# Patient Record
Sex: FEMALE | Race: WHITE | Employment: FULL TIME | URBAN - METROPOLITAN AREA
[De-identification: names, ages, dates, MRNs, and addresses within clinical notes are randomized per-mention and may not be internally consistent; named-entity substitution may affect disease eponyms.]

---

## 2017-02-14 DIAGNOSIS — N92.6 IRREGULAR MENSTRUAL CYCLE: ICD-10-CM

## 2017-02-14 DIAGNOSIS — N94.6 DYSMENORRHEA: ICD-10-CM

## 2017-02-14 RX ORDER — NORETHINDRONE ACETATE AND ETHINYL ESTRADIOL 1; 20 MG/1; UG/1
TABLET ORAL
Qty: 1 DOSE PACK | Refills: 2 | Status: SHIPPED | OUTPATIENT
Start: 2017-02-14 | End: 2017-06-22 | Stop reason: SDUPTHER

## 2017-06-20 DIAGNOSIS — N92.6 IRREGULAR MENSTRUAL CYCLE: ICD-10-CM

## 2017-06-20 DIAGNOSIS — N94.6 DYSMENORRHEA: ICD-10-CM

## 2017-06-20 NOTE — LETTER
6/23/2017 12:13 PM 
 
Ms. Mckinley Dobbins [facility] [facility] [id number] Dear Ms. Shaikh Else missed you! Please call our office at [phone] and schedule your annual exam  appointment for your continued care. Look forward to seeing you soon.   
 
 
 
Sincerely, 
 
 
Jazmine Meier MD

## 2017-06-22 RX ORDER — NORETHINDRONE ACETATE AND ETHINYL ESTRADIOL 1; .02 MG/1; MG/1
TABLET ORAL
Qty: 1 DOSE PACK | Refills: 2 | Status: SHIPPED | OUTPATIENT
Start: 2017-06-22 | End: 2017-07-26 | Stop reason: SDUPTHER

## 2017-07-26 DIAGNOSIS — N92.6 IRREGULAR MENSTRUAL CYCLE: ICD-10-CM

## 2017-07-26 DIAGNOSIS — N94.6 DYSMENORRHEA: ICD-10-CM

## 2017-07-26 NOTE — LETTER
7/31/2017 10:26 AM 
 
Ms. Shauna Dolan [address] [facility] [address] Dear Ms. Jenni Preciado missed you! Please call our office at [phone] and schedule your annual physical exam appointment for your continued care.  
 
 
 
Sincerely, 
 
 
Maryann Joseph MD

## 2017-07-30 RX ORDER — NORETHINDRONE ACETATE AND ETHINYL ESTRADIOL 1; .02 MG/1; MG/1
TABLET ORAL
Qty: 3 DOSE PACK | Refills: 0 | Status: SHIPPED | OUTPATIENT
Start: 2017-07-30 | End: 2018-02-16 | Stop reason: SDUPTHER

## 2018-02-16 DIAGNOSIS — N92.6 IRREGULAR MENSTRUAL CYCLE: ICD-10-CM

## 2018-02-16 DIAGNOSIS — N94.6 DYSMENORRHEA: ICD-10-CM

## 2018-02-17 RX ORDER — NORETHINDRONE ACETATE AND ETHINYL ESTRADIOL 1; .02 MG/1; MG/1
TABLET ORAL
Qty: 3 DOSE PACK | Refills: 0 | Status: SHIPPED | OUTPATIENT
Start: 2018-02-17 | End: 2019-07-10

## 2019-07-09 ENCOUNTER — OFFICE VISIT (OUTPATIENT)
Dept: OBGYN CLINIC | Age: 39
End: 2019-07-09

## 2019-07-09 VITALS
HEIGHT: 60 IN | WEIGHT: 222.4 LBS | SYSTOLIC BLOOD PRESSURE: 162 MMHG | BODY MASS INDEX: 43.66 KG/M2 | DIASTOLIC BLOOD PRESSURE: 99 MMHG

## 2019-07-09 DIAGNOSIS — N76.4 VULVAR ABSCESS: Primary | ICD-10-CM

## 2019-07-09 DIAGNOSIS — I10 HYPERTENSION, UNSPECIFIED TYPE: ICD-10-CM

## 2019-07-09 DIAGNOSIS — E66.01 OBESITY, MORBID (HCC): ICD-10-CM

## 2019-07-09 NOTE — PROGRESS NOTES
Vulvar lesion evaluation    Sadie Anderson is a 41 y.o. female  Patient's last menstrual period was 06/11/2019 (approximate). who presents with a lump on her left vulva  She noticed it 6 days ago. The lesion has shown the following change: it started to drain. She went to an urgent care place on Sunday. \"I couldn't take it anymore! \"  They put her on Doxy. She reports the following associated symptoms: She states it still burns when urine hits it. She denies the following associated symptoms: itching, pain, redness, drainage, swelling, irritation. Aggravating factors: none. Alleviating factors: none. Patient states her last annual exam was 6/2017 and her pap was normal in [place]. I advised patient that I see an ASCUS +HPV pap in [facility] from 2014 and I asked if she had any follow up after that she said she does not remember ever being told she had an abnormal pap smear. Denies hx of DM    Past Medical History:   Diagnosis Date    Dysmenorrhea 8/19/2016    Edema of both legs 7/31/2014    Hypertension      Past Surgical History:   Procedure Laterality Date    HX HEENT      tonsillectomy     Social History     Occupational History    Not on file   Tobacco Use    Smoking status: Never Smoker    Smokeless tobacco: Never Used   Substance and Sexual Activity    Alcohol use: Yes    Drug use: No    Sexual activity: Yes     Partners: Male     Birth control/protection: Pill     Family History   Problem Relation Age of Onset    Hypertension Mother        Allergies   Allergen Reactions    Pcn [Penicillins] Hives     Prior to Admission medications    Medication Sig Start Date End Date Taking?  Authorizing Provider   norethindrone-ethinyl estradiol (MICROGESTIN 1/20) 1-20 mg-mcg tab Take 1 tablet by mouth  daily 2/17/18  Yes Jatin Walters MD   omeprazole (PRILOSEC) 40 mg capsule TAKE ONE CAPSULE BY MOUTH EVERY DAY 7/13/16   Provider, Historical        Review of Systems: History obtained from the patient  Constitutional: negative for weight loss, fever, night sweats  GI: negative for change in bowel habits, abdominal pain, black or bloody stools  : negative for frequency, dysuria, hematuria, vaginal discharge  MSK: negative for back pain, joint pain, muscle pain  Skin: negative for itching, rash, hives elsewhere  Neuro: negative for dizziness, headache, confusion, weakness  Psych: negative for anxiety, depression, change in mood  Heme/lymph: negative for bleeding, bruising, pallor    Objective:  Visit Vitals  BP (!) 162/99 (BP 1 Location: Left arm, BP Patient Position: Sitting)   Ht 5' (1.524 m)   Wt 222 lb 6.4 oz (100.9 kg)   LMP 06/11/2019 (Approximate)   BMI 43.43 kg/m²       Physical Exam:   PHYSICAL EXAMINATION    Constitutional  · Appearance: well-nourished, well developed, alert, in no acute distress    Gastrointestinal  · Abdominal Examination: abdomen non-tender to palpation, normal bowel sounds, no masses present  · Liver and spleen: no hepatomegaly present, spleen not palpable  · Hernias: no hernias identified    Genitourinary  · External Genitalia: normal appearance for age, no discharge present, no tenderness present, no inflammatory lesions present, no masses present, no atrophy present  · Vagina: normal vaginal vault without central or paravaginal defects, no discharge present, no inflammatory lesions present, no masses present  · Bladder: non-tender to palpation  · Urethra: appears normal  · Cervix: normal   · Uterus: normal size, shape and consistency  · Adnexa: no adnexal tenderness present, no adnexal masses present  · Perineum: perineum within normal limits, no evidence of trauma, no rashes or skin lesions present  · Anus: anus within normal limits, no hemorrhoids present  · Inguinal Lymph Nodes: no lymphadenopathy present    Skin  · General Inspection: no rash, no lesions identified    Neurologic/Psychiatric  · Mental Status:  · Orientation: grossly oriented to person, place and time  · Mood and Affect: mood normal, affect appropriate    Assessment:   Vulvar abscess resolved  Hx of abnl pap  HTN  Hx of anovulation    Plan:   Stop OC  AE 8/2019  Menstrual calendar - consider monthly provera - not SA  See PCP to evaluate elevated BP ( on meds)    RTO prn if symptoms persist or worsen. Instructions given to pt. Handouts given to pt.

## 2019-07-09 NOTE — PATIENT INSTRUCTIONS
Female Reproductive Organs, Side View: Anatomy Sketch    Current as of: May 14, 2018  Content Version: 11.9  © 7317-2998 [employer]. Care instructions adapted under license by [employer] (which disclaims liability or warranty for this information). If you have questions about a medical condition or this instruction, always ask your healthcare professional. [place] [address] any warranty or liability for your use of this information.

## 2019-07-10 ENCOUNTER — OFFICE VISIT (OUTPATIENT)
Dept: FAMILY MEDICINE CLINIC | Age: 39
End: 2019-07-10

## 2019-07-10 VITALS
TEMPERATURE: 97.6 F | RESPIRATION RATE: 18 BRPM | SYSTOLIC BLOOD PRESSURE: 160 MMHG | WEIGHT: 222 LBS | HEIGHT: 60 IN | DIASTOLIC BLOOD PRESSURE: 90 MMHG | BODY MASS INDEX: 43.59 KG/M2 | HEART RATE: 100 BPM

## 2019-07-10 DIAGNOSIS — I10 BENIGN ESSENTIAL HTN: Primary | ICD-10-CM

## 2019-07-10 DIAGNOSIS — R00.0 TACHYCARDIA: ICD-10-CM

## 2019-07-10 DIAGNOSIS — F41.9 ANXIETY: ICD-10-CM

## 2019-07-10 RX ORDER — SERTRALINE HYDROCHLORIDE 25 MG/1
25 TABLET, FILM COATED ORAL DAILY
Qty: 30 TAB | Refills: 0 | Status: SHIPPED | OUTPATIENT
Start: 2019-07-10 | End: 2019-08-06 | Stop reason: SDUPTHER

## 2019-07-10 RX ORDER — METOPROLOL SUCCINATE 100 MG/1
100 TABLET, EXTENDED RELEASE ORAL DAILY
Qty: 30 TAB | Refills: 1 | Status: SHIPPED | OUTPATIENT
Start: 2019-07-10 | End: 2019-09-06 | Stop reason: SDUPTHER

## 2019-07-10 RX ORDER — METOPROLOL SUCCINATE 50 MG/1
TABLET, EXTENDED RELEASE ORAL
Refills: 2 | COMMUNITY
Start: 2019-06-14 | End: 2019-07-10 | Stop reason: DRUGHIGH

## 2019-07-10 NOTE — PROGRESS NOTES
HISTORY OF PRESENT ILLNESS  Mirtha Albrecht is a 41 y.o. female. Mirtha Albrecht is here to follow up on their HTN. She has been in [place] for the past 2 years and had a workup for tachycardia in February. I have reviewed her records and she had normal thyroid testing, a normal echocardiogram and a 24 hour Holter showing persistent tachy cardia. Her HTN is a chronic problem. The current episode started more than 1 week ago. The problem occurs constantly and is getting worse. Pertinent negatives include no chest pain, no abdominal pain, no headaches and no shortness of breath. Nothing aggravates the symptoms. The symptoms are relieved by Toprol XL, which is working mildly. Also, she has been easily excitable for the past six months. Her symptoms are unchanged. This started with her diagnosis of tachycardia. She has tried relaxation techniques which help sometimes. She worries a lot about her health. Her biggest triggers are feeling her heart beating in her chest and certain customers at work. She has never had problems with this before and she has no psych history. No family history of psych issues, but her father is easily excitable as well. She has chronic tingling in the fingertips of both hands. Review of Systems   Constitutional: Negative for weight loss. No weight gain   Eyes: Negative for blurred vision. Respiratory: Negative for shortness of breath. Cardiovascular: Positive for palpitations. Negative for chest pain and leg swelling. Gastrointestinal: Negative for abdominal pain. Neurological: Negative for dizziness, sensory change, speech change, focal weakness and headaches. Psychiatric/Behavioral: Negative for depression, hallucinations, substance abuse and suicidal ideas. The patient is nervous/anxious and has insomnia. She has trouble falling asleep about 2-3 times a week.        Visit Vitals  BP (!) 171/105   Pulse (!) 112   Temp 97.6 °F (36.4 °C) (Oral) Resp 18   Ht 5' (1.524 m)   Wt 222 lb (100.7 kg)   LMP 06/11/2019 (Approximate)   BMI 43.36 kg/m²     BP Readings from Last 3 Encounters:   07/10/19 (!) 171/105   07/09/19 (!) 162/99   08/19/16 135/87     Physical Exam   Constitutional: She is oriented to person, place, and time. She appears well-developed and well-nourished. No distress. Cardiovascular: Normal rate, regular rhythm and normal heart sounds. Exam reveals no gallop and no friction rub. No murmur heard. Pulmonary/Chest: Effort normal and breath sounds normal. No respiratory distress. She has no wheezes. She has no rales. Musculoskeletal: She exhibits no edema. Neurological: She is alert and oriented to person, place, and time. She displays no tremor. Skin: Skin is warm and dry. She is not diaphoretic. Psychiatric: She has a normal mood and affect. Her behavior is normal. Judgment and thought content normal.   Initially anxious and tearful about her blood pressure   Nursing note and vitals reviewed. ASSESSMENT and PLAN    ICD-10-CM ICD-9-CM    1. Benign essential HTN I10 401.1 metoprolol succinate (TOPROL XL) 100 mg tablet   2. Anxiety F41.9 300.00 sertraline (ZOLOFT) 25 mg tablet      REFERRAL TO PSYCHOLOGY   3. Tachycardia R00.0 785.0 REFERRAL TO CARDIOLOGY        Blood pressure needs improvement  Anxiety, needs treatment  Tachycardia, worsened by anxiety  Increase Toprol XL  Start Zoloft  Psychology referral  Cardiology referral            Reviewed plan of care. Patient has provided input and agrees with goals.

## 2019-07-10 NOTE — PROGRESS NOTES
Chief Complaint   Patient presents with    Blood Pressure Check     f/u and bp med eval     1. Have you been to the ER, urgent care clinic since your last visit? Hospitalized since your last visit? No     2. Have you seen or consulted any other health care providers outside of the [address] [place] [address] since your last visit? Include any pap smears or colon screening.  No

## 2019-08-06 DIAGNOSIS — F41.9 ANXIETY: ICD-10-CM

## 2019-08-07 RX ORDER — SERTRALINE HYDROCHLORIDE 25 MG/1
TABLET, FILM COATED ORAL
Qty: 30 TAB | Refills: 0 | Status: SHIPPED | OUTPATIENT
Start: 2019-08-07 | End: 2019-09-08 | Stop reason: SDUPTHER

## 2019-09-06 DIAGNOSIS — I10 BENIGN ESSENTIAL HTN: ICD-10-CM

## 2019-09-06 RX ORDER — METOPROLOL SUCCINATE 100 MG/1
TABLET, EXTENDED RELEASE ORAL
Qty: 30 TAB | Refills: 0 | Status: SHIPPED | OUTPATIENT
Start: 2019-09-06 | End: 2019-10-02 | Stop reason: SDUPTHER

## 2019-09-06 NOTE — TELEPHONE ENCOUNTER
On call note. Pt running out of Metoprolol 100 mg. She is going out of town for 2 weeks. Will refill and instructed pt to set appt with Dr Alonzo Sierra.

## 2019-09-08 DIAGNOSIS — F41.9 ANXIETY: ICD-10-CM

## 2019-09-09 RX ORDER — SERTRALINE HYDROCHLORIDE 25 MG/1
TABLET, FILM COATED ORAL
Qty: 30 TAB | Refills: 0 | Status: SHIPPED | OUTPATIENT
Start: 2019-09-09 | End: 2019-10-18

## 2019-10-02 DIAGNOSIS — I10 BENIGN ESSENTIAL HTN: ICD-10-CM

## 2019-10-02 RX ORDER — METOPROLOL SUCCINATE 100 MG/1
TABLET, EXTENDED RELEASE ORAL
Qty: 30 TAB | Refills: 0 | Status: SHIPPED | OUTPATIENT
Start: 2019-10-02 | End: 2019-10-18

## 2019-10-18 ENCOUNTER — OFFICE VISIT (OUTPATIENT)
Dept: FAMILY MEDICINE CLINIC | Age: 39
End: 2019-10-18

## 2019-10-18 VITALS
HEIGHT: 60 IN | SYSTOLIC BLOOD PRESSURE: 147 MMHG | BODY MASS INDEX: 44.37 KG/M2 | HEART RATE: 100 BPM | RESPIRATION RATE: 18 BRPM | WEIGHT: 226 LBS | TEMPERATURE: 97.7 F | DIASTOLIC BLOOD PRESSURE: 100 MMHG

## 2019-10-18 DIAGNOSIS — R60.0 BILATERAL LEG EDEMA: ICD-10-CM

## 2019-10-18 DIAGNOSIS — F41.9 ANXIETY: Primary | ICD-10-CM

## 2019-10-18 DIAGNOSIS — I10 BENIGN ESSENTIAL HTN: ICD-10-CM

## 2019-10-18 RX ORDER — METOPROLOL SUCCINATE 200 MG/1
TABLET, EXTENDED RELEASE ORAL
Qty: 30 TAB | Refills: 1 | Status: SHIPPED | OUTPATIENT
Start: 2019-10-18 | End: 2019-12-15 | Stop reason: SDUPTHER

## 2019-10-18 RX ORDER — SERTRALINE HYDROCHLORIDE 25 MG/1
TABLET, FILM COATED ORAL
Qty: 30 TAB | Refills: 0 | Status: SHIPPED | OUTPATIENT
Start: 2019-10-18 | End: 2020-05-18

## 2019-10-18 NOTE — PROGRESS NOTES
Chief Complaint   Patient presents with    Blood Pressure Check     1. Have you been to the ER, urgent care clinic since your last visit? Hospitalized since your last visit? No     2. Have you seen or consulted any other health care providers outside of the [address] [place] [address] since your last visit? Include any pap smears or colon screening. No     Pt declines tdap.

## 2019-10-18 NOTE — PROGRESS NOTES
HISTORY OF PRESENT ILLNESS  Jose Elias Gordon is a 42 y.o. female. Patient presents with:  Blood Pressure Check    Jose Elias Gordon is here to follow up on her anxiety. It is unchanged. At her last visit, I started her on Zoloft, but she only took it a little over 2 weeks and because she was having hot spells. She thinks it was working and would like to retry it. Driving sometimes makes her anxiety worse. She is also here to follow up on her HTN. Her blood pressure is elevated again. Review of Systems   Constitutional: Negative for malaise/fatigue and weight loss. No weight gain   Eyes: Negative for blurred vision. Respiratory: Negative for shortness of breath. Cardiovascular: Positive for leg swelling. Negative for chest pain and palpitations. She had an episode of LE edema when she went to a conference during the and sat with her legs hanging. Gastrointestinal: Negative for abdominal pain. Neurological: Negative for dizziness, sensory change, speech change, focal weakness and headaches. Psychiatric/Behavioral: Negative for depression, hallucinations, substance abuse and suicidal ideas. The patient is nervous/anxious. The patient does not have insomnia. Visit Vitals  BP (!) 147/100   Pulse 100   Temp 97.7 °F (36.5 °C) (Oral)   Resp 18   Ht 5' (1.524 m)   Wt 226 lb (102.5 kg)   BMI 44.14 kg/m²     BP Readings from Last 3 Encounters:   07/10/19 160/90   07/09/19 (!) 162/99   08/19/16 135/87     Physical Exam   Constitutional: She is oriented to person, place, and time. She appears well-developed and well-nourished. No distress. Cardiovascular: Normal rate, regular rhythm and normal heart sounds. Exam reveals no gallop and no friction rub. No murmur heard. Pulmonary/Chest: Effort normal and breath sounds normal. No respiratory distress. She has no wheezes. She has no rales. Musculoskeletal: She exhibits no edema.    Neurological: She is alert and oriented to person, place, and time. She displays no tremor. Skin: Skin is warm and dry. She is not diaphoretic. Psychiatric: She has a normal mood and affect. Her behavior is normal. Judgment and thought content normal.   Nursing note and vitals reviewed. ASSESSMENT and PLAN    ICD-10-CM ICD-9-CM    1. Anxiety F41.9 300.00 sertraline (ZOLOFT) 25 mg tablet   2. Benign essential HTN I07 149.9 METABOLIC PANEL, BASIC      metoprolol succinate (TOPROL-XL) 200 mg XL tablet   3. Bilateral leg edema R60.0 782. 3         Blood pressure elevated  Still with anxiety, off Zoloft  Transient edema  Increase Toprol XL  Restart Zoloft  Labs per orders. Follow-up and Dispositions    · Return in about 1 month (around 11/18/2019) for blood pressure, anxiety. Reviewed plan of care. Patient has provided input and agrees with goals.

## 2019-12-15 DIAGNOSIS — I10 BENIGN ESSENTIAL HTN: ICD-10-CM

## 2019-12-16 RX ORDER — METOPROLOL SUCCINATE 200 MG/1
TABLET, EXTENDED RELEASE ORAL
Qty: 30 TAB | Refills: 1 | Status: SHIPPED | OUTPATIENT
Start: 2019-12-16 | End: 2019-12-17 | Stop reason: SDUPTHER

## 2019-12-17 DIAGNOSIS — I10 BENIGN ESSENTIAL HTN: ICD-10-CM

## 2019-12-17 NOTE — TELEPHONE ENCOUNTER
Pt scheduled for 3 month HTN follow up on 1/17/20 and is requesting refill for Metoprolol be sent to local pharmacy.  Tanisha

## 2019-12-21 RX ORDER — METOPROLOL SUCCINATE 200 MG/1
200 TABLET, EXTENDED RELEASE ORAL DAILY
Qty: 30 TAB | Refills: 1 | Status: SHIPPED | OUTPATIENT
Start: 2019-12-21 | End: 2020-04-24

## 2020-04-22 DIAGNOSIS — I10 BENIGN ESSENTIAL HTN: ICD-10-CM

## 2020-04-24 RX ORDER — METOPROLOL SUCCINATE 200 MG/1
TABLET, EXTENDED RELEASE ORAL
Qty: 30 TAB | Refills: 1 | Status: SHIPPED | OUTPATIENT
Start: 2020-04-24 | End: 2020-06-21

## 2020-05-18 ENCOUNTER — VIRTUAL VISIT (OUTPATIENT)
Dept: FAMILY MEDICINE CLINIC | Age: 40
End: 2020-05-18

## 2020-05-18 VITALS — WEIGHT: 226 LBS | BODY MASS INDEX: 44.37 KG/M2 | HEIGHT: 60 IN | RESPIRATION RATE: 16 BRPM

## 2020-05-18 DIAGNOSIS — I10 BENIGN ESSENTIAL HTN: Primary | ICD-10-CM

## 2020-05-18 DIAGNOSIS — F41.9 ANXIETY: ICD-10-CM

## 2020-05-18 NOTE — PROGRESS NOTES
Vik Tamayo is a 42 y.o. female who was seen by synchronous (real-time) audio-video technology on 5/18/2020. Assessment & Plan:   Diagnoses and all orders for this visit:    1. Benign essential HTN  -     METABOLIC PANEL, BASIC; Future    2. Anxiety        Unknown BP  Anxiety controlled  Labs per orders. Continue current plans for now  She will call me with her blood pressure and pulse later this week    Follow-up and Dispositions    · Return pending BP result. Reviewed plan of care. Patient has provided input and agrees with goals. CPT Codes 08029-57424 for Established Patients may apply to this Telehealth Visit      Subjective:   Vik Tamayo was seen for Medication Evaluation      Vik Tamayo is here to follow up on their HTN. This is a chronic problem. The problem occurs constantly and is ? Chelle Shetty The symptoms are relieved by Toprol XL, which is/are working ? Chelle Shetty She will be getting a blood pressure machine this week. She also needs follow up on her anxiety, however, she has not been taking the Zoloft. It is more work related and she can easily get ramped, but it does not interfere with her job. Review of Systems   Constitutional: Positive for weight loss. No weight gain   Eyes: Negative for blurred vision. Respiratory: Negative for shortness of breath. Cardiovascular: Positive for leg swelling. Negative for chest pain. Gastrointestinal: Negative for abdominal pain. Neurological: Negative for dizziness, sensory change, speech change, focal weakness and headaches. Objective:     Visit Vitals  Resp 16   Ht 5' (1.524 m)   Wt 226 lb (102.5 kg)   LMP  (LMP Unknown) Comment: Not having regular periods   BMI 44.14 kg/m²     BP Readings from Last 3 Encounters:   10/18/19 (!) 147/100   07/10/19 160/90   07/09/19 (!) 162/99       Physical Exam  Constitutional:       General: She is not in acute distress. Appearance: Normal appearance.  She is not diaphoretic. Neurological:      Mental Status: She is alert and oriented to person, place, and time. Motor: No tremor. Psychiatric:         Mood and Affect: Mood normal.         Behavior: Behavior normal.         Thought Content: Thought content normal.         Judgment: Judgment normal.         Due to this being a TeleHealth evaluation, many elements of the physical examination are unable to be assessed. We discussed the expected course, resolution and complications of the diagnosis(es) in detail. Medication risks, benefits, costs, interactions, and alternatives were discussed as indicated. I advised her to contact the office if her condition worsens, changes or fails to improve as anticipated. She expressed understanding with the diagnosis(es) and plan. Pursuant to the emergency declaration under the [facility], [facility] waiver authority and the [employer] and [employer] Act, this Virtual  Visit was conducted, with patient's consent, to reduce the patient's risk of exposure to COVID-19 and provide continuity of care for an established patient. Services were provided through a video synchronous discussion virtually to substitute for in-person clinic visit.     Henrique Salazar MD

## 2020-05-18 NOTE — PROGRESS NOTES
Chief Complaint   Patient presents with    Medication Evaluation     1. Have you been to the ER, urgent care clinic since your last visit? Hospitalized since your last visit? No    2. Have you seen or consulted any other health care providers outside of the [address] [place] [address] since your last visit? Include any pap smears or colon screening.  No    Patient completing visit from [place], [place].

## 2020-05-19 ENCOUNTER — HOSPITAL ENCOUNTER (OUTPATIENT)
Dept: LAB | Age: 40
Discharge: HOME | End: 2020-05-19

## 2020-05-19 DIAGNOSIS — I10 BENIGN ESSENTIAL HTN: ICD-10-CM

## 2020-05-19 LAB
ANION GAP SERPL CALC-SCNC: 6 MMOL/L (ref 5–15)
BUN SERPL-MCNC: 7 MG/DL (ref 6–20)
BUN/CREAT SERPL: 11 (ref 12–20)
CALCIUM SERPL-MCNC: 9.6 MG/DL (ref 8.5–10.1)
CHLORIDE SERPL-SCNC: 100 MMOL/L (ref 97–108)
CO2 SERPL-SCNC: 31 MMOL/L (ref 21–32)
CREAT SERPL-MCNC: 0.64 MG/DL (ref 0.55–1.02)
GLUCOSE SERPL-MCNC: 121 MG/DL (ref 65–100)
POTASSIUM SERPL-SCNC: 4.4 MMOL/L (ref 3.5–5.1)
SODIUM SERPL-SCNC: 137 MMOL/L (ref 136–145)

## 2020-05-20 DIAGNOSIS — R73.09 ELEVATED GLUCOSE: Primary | ICD-10-CM

## 2020-05-21 ENCOUNTER — TELEPHONE (OUTPATIENT)
Dept: FAMILY MEDICINE CLINIC | Age: 40
End: 2020-05-21

## 2020-05-21 NOTE — TELEPHONE ENCOUNTER
----- Message from Pinger sent at 5/21/2020 11:09 AM EDT -----  Regarding: Dr. Bright Owens  Pt is requesting a call back to discuss her lab slip not being enclosed on her [employer] message. Best contact number is [phone].

## 2020-05-25 DIAGNOSIS — I10 BENIGN ESSENTIAL HTN: Primary | ICD-10-CM

## 2020-05-25 RX ORDER — AMLODIPINE BESYLATE 5 MG/1
5 TABLET ORAL DAILY
Qty: 30 TAB | Refills: 1 | Status: SHIPPED | OUTPATIENT
Start: 2020-05-25 | End: 2020-06-23

## 2020-06-02 ENCOUNTER — HOSPITAL ENCOUNTER (OUTPATIENT)
Dept: LAB | Age: 40
Discharge: HOME | End: 2020-06-02

## 2020-06-02 DIAGNOSIS — R73.09 ELEVATED GLUCOSE: ICD-10-CM

## 2020-06-02 LAB
EST. AVERAGE GLUCOSE BLD GHB EST-MCNC: 111 MG/DL
HBA1C MFR BLD: 5.5 % (ref 4–5.6)

## 2020-06-21 DIAGNOSIS — I10 BENIGN ESSENTIAL HTN: ICD-10-CM

## 2020-06-21 RX ORDER — METOPROLOL SUCCINATE 200 MG/1
TABLET, EXTENDED RELEASE ORAL
Qty: 30 TAB | Refills: 0 | Status: SHIPPED | OUTPATIENT
Start: 2020-06-21 | End: 2020-07-21

## 2020-06-22 NOTE — TELEPHONE ENCOUNTER
Please call patient and find out what her recent blood pressure and pulse are. I cannot continue to refill her medication until I have this information.

## 2020-06-23 ENCOUNTER — TELEPHONE (OUTPATIENT)
Dept: FAMILY MEDICINE CLINIC | Age: 40
End: 2020-06-23

## 2020-06-23 DIAGNOSIS — I10 BENIGN ESSENTIAL HTN: ICD-10-CM

## 2020-06-23 RX ORDER — AMLODIPINE BESYLATE 10 MG/1
10 TABLET ORAL DAILY
Qty: 30 TAB | Refills: 1 | Status: SHIPPED | OUTPATIENT
Start: 2020-06-23 | End: 2020-08-04 | Stop reason: ALTCHOICE

## 2020-06-23 NOTE — TELEPHONE ENCOUNTER
Pt called to report BP from this am per Dr Vida Oreilly request:  127/90    She is also having fairly severe pain in the front thigh muscle. States it started over the weekend when she pulled her back. States there is a knot in the muscle. Has tried stretches which are not helping. Please advise.  Pt can be reached at [phone]

## 2020-06-23 NOTE — TELEPHONE ENCOUNTER
Please call patient and let her know I am increasing her amlodipine to 10 mg and have sent this to her pharmacy. I need to see her for her blood pressure in 6 weeks. Needs to go to urgent care or the ER today to have the \"knot\" in her leg evaluated.

## 2020-07-18 DIAGNOSIS — I10 BENIGN ESSENTIAL HTN: ICD-10-CM

## 2020-07-18 NOTE — LETTER
7/22/2020 4:30 PM 
 
Ms. Mariah Logan [address] [facility] [address] [facility] 99 [address] Dear Ms. AndinoconnerAugustus Ngan missed you! Please call our office at [phone] and schedule a blood pressure  follow up appointment for your continued care, with in the next 30 days. We are offering virtual appointments as well. Look forward to seeing you soon.   
 
 
 
Sincerely, 
 
 
Fior Medrano MD

## 2020-07-21 RX ORDER — METOPROLOL SUCCINATE 200 MG/1
TABLET, EXTENDED RELEASE ORAL
Qty: 30 TAB | Refills: 0 | Status: SHIPPED | OUTPATIENT
Start: 2020-07-21 | End: 2020-08-20

## 2020-08-03 ENCOUNTER — E-VISIT (OUTPATIENT)
Dept: FAMILY MEDICINE CLINIC | Age: 40
End: 2020-08-03

## 2020-08-03 DIAGNOSIS — I10 BENIGN ESSENTIAL HTN: Primary | ICD-10-CM

## 2020-08-04 ENCOUNTER — VIRTUAL VISIT (OUTPATIENT)
Dept: FAMILY MEDICINE CLINIC | Age: 40
End: 2020-08-04
Payer: COMMERCIAL

## 2020-08-04 DIAGNOSIS — I10 BENIGN ESSENTIAL HTN: Primary | ICD-10-CM

## 2020-08-04 DIAGNOSIS — R60.0 BILATERAL LEG EDEMA: ICD-10-CM

## 2020-08-04 PROCEDURE — 99213 OFFICE O/P EST LOW 20 MIN: CPT | Performed by: FAMILY MEDICINE

## 2020-08-04 RX ORDER — HYDROCHLOROTHIAZIDE 25 MG/1
25 TABLET ORAL DAILY
Qty: 30 TAB | Refills: 1 | Status: SHIPPED | OUTPATIENT
Start: 2020-08-04 | End: 2020-09-24 | Stop reason: SDUPTHER

## 2020-08-04 NOTE — PROGRESS NOTES
Chief Complaint   Patient presents with    Leg Swelling    Ankle swelling   1. Have you been to the ER, urgent care clinic since your last visit? Hospitalized since your last visit? No    2. Have you seen or consulted any other health care providers outside of the [address] [place] [address] since your last visit? Include any pap smears or colon screening.  No

## 2020-08-04 NOTE — PROGRESS NOTES
Jose Elias Gordon is a 42 y.o. female who was seen by synchronous (real-time) audio-video technology on 8/4/2020. Assessment & Plan:   Diagnoses and all orders for this visit:    1. Benign essential HTN  -     hydroCHLOROthiazide (HYDRODIURIL) 25 mg tablet; Take 1 Tab by mouth daily. 2. Bilateral leg edema      Blood pressure controlled  Edema due to Norvasc  Stop Norvasc  Start hydrochlorothiazide      Follow-up and Dispositions    · Return in about 6 weeks (around 9/15/2020) for blood pressure. Reviewed plan of care. Patient has provided input and agrees with goals. CPT Codes 81895-06644 for Established Patients may apply to this Telehealth Visit      Subjective:   Jose Elias Gordon was seen for Leg Swelling (Bilateral) and Ankle swelling      Jose Elias Gordon is here to follow up on their HTN. This is a chronic problem. The problem occurs constantly and is improved. The symptoms are relieved by Norvasc, Toprol XL, which is/are working well, however, since her Norvasc was increased, she has had swelling in her legs and ankles. Review of Systems   Constitutional: Negative for weight loss. No weight gain   Eyes: Negative for blurred vision. Respiratory: Negative for shortness of breath. Cardiovascular: Positive for leg swelling. Negative for chest pain. Gastrointestinal: Negative for abdominal pain. Neurological: Negative for dizziness, sensory change, speech change, focal weakness and headaches. Objective:   /87, P 96, T 97.7 F  Physical Exam  Constitutional:       General: She is not in acute distress. Appearance: Normal appearance. Musculoskeletal:      Right lower leg: Edema present. Left lower leg: Edema present. Neurological:      Mental Status: She is alert and oriented to person, place, and time. Due to this being a TeleHealth evaluation, many elements of the physical examination are unable to be assessed.          We discussed the expected course, resolution and complications of the diagnosis(es) in detail. Medication risks, benefits, costs, interactions, and alternatives were discussed as indicated. I advised her to contact the office if her condition worsens, changes or fails to improve as anticipated. She expressed understanding with the diagnosis(es) and plan. Pursuant to the emergency declaration under the [address] [place] [address] waiver authority and the [employer] and [employer] Act, this Virtual  Visit was conducted, with patient's consent, to reduce the patient's risk of exposure to COVID-19 and provide continuity of care for an established patient. Services were provided through a video synchronous discussion virtually to substitute for in-person clinic visit.     Sunday Bach MD

## 2020-08-16 DIAGNOSIS — I10 BENIGN ESSENTIAL HTN: ICD-10-CM

## 2020-08-16 NOTE — LETTER
8/18/2020 2:00 PM 
 
Ms. Jenny Pritchett [address] [facility] [address] [facility] 99 [address] Dear MsGabrielle Franky Erica missed you! Please call our office at [phone] and schedule a blood pressure  follow up appointment for your continued care. Look forward to seeing you soon, we are offering virtual appointments as well.   
 
 
 
Sincerely, 
 
 
Sunday Bach MD

## 2020-08-18 RX ORDER — AMLODIPINE BESYLATE 10 MG/1
TABLET ORAL
Qty: 30 TAB | Refills: 1 | Status: SHIPPED | OUTPATIENT
Start: 2020-08-18 | End: 2020-09-24 | Stop reason: SDUPTHER

## 2020-08-19 DIAGNOSIS — I10 BENIGN ESSENTIAL HTN: ICD-10-CM

## 2020-08-20 RX ORDER — METOPROLOL SUCCINATE 200 MG/1
TABLET, EXTENDED RELEASE ORAL
Qty: 30 TAB | Refills: 0 | Status: SHIPPED | OUTPATIENT
Start: 2020-08-20 | End: 2020-09-24 | Stop reason: SDUPTHER

## 2020-09-24 DIAGNOSIS — I10 BENIGN ESSENTIAL HTN: ICD-10-CM

## 2020-09-24 RX ORDER — HYDROCHLOROTHIAZIDE 25 MG/1
25 TABLET ORAL DAILY
Qty: 30 TAB | Refills: 0 | Status: SHIPPED | OUTPATIENT
Start: 2020-09-24 | End: 2020-11-05 | Stop reason: SDUPTHER

## 2020-09-24 RX ORDER — METOPROLOL SUCCINATE 200 MG/1
TABLET, EXTENDED RELEASE ORAL
Qty: 30 TAB | Refills: 0 | OUTPATIENT
Start: 2020-09-24

## 2020-09-24 RX ORDER — AMLODIPINE BESYLATE 10 MG/1
TABLET ORAL
Qty: 30 TAB | Refills: 1 | OUTPATIENT
Start: 2020-09-24

## 2020-09-24 RX ORDER — HYDROCHLOROTHIAZIDE 25 MG/1
25 TABLET ORAL DAILY
Qty: 30 TAB | Refills: 0 | OUTPATIENT
Start: 2020-09-24

## 2020-09-24 RX ORDER — METOPROLOL SUCCINATE 200 MG/1
TABLET, EXTENDED RELEASE ORAL
Qty: 30 TAB | Refills: 0 | Status: SHIPPED | OUTPATIENT
Start: 2020-09-24 | End: 2020-10-23 | Stop reason: SDUPTHER

## 2020-09-24 RX ORDER — AMLODIPINE BESYLATE 10 MG/1
TABLET ORAL
Qty: 30 TAB | Refills: 0 | Status: SHIPPED | OUTPATIENT
Start: 2020-09-24 | End: 2020-10-07

## 2020-09-24 NOTE — TELEPHONE ENCOUNTER
Due to work pt is unable to be seen until next Tuesday, and has scheduled for that date. Pt will run out of blood pressure medication today. Pt does ask for a refill and will keep scheduled appointment.

## 2020-09-24 NOTE — TELEPHONE ENCOUNTER
Pt already scheduled for 6 week blood pressure follow up with Dr. Roman Miller on 9/29/20, but is now completely out of blood pressure medication and wants to  at pharmacy today. Dr. Roman Miller initially refused prescriptions due to pt needing appointment but agrees to fill since she saw pt last month and follow up is scheduled.   Tanisha

## 2020-10-07 ENCOUNTER — TELEPHONE (OUTPATIENT)
Dept: FAMILY MEDICINE CLINIC | Age: 40
End: 2020-10-07

## 2020-10-07 ENCOUNTER — VIRTUAL VISIT (OUTPATIENT)
Dept: FAMILY MEDICINE CLINIC | Age: 40
End: 2020-10-07
Payer: COMMERCIAL

## 2020-10-07 DIAGNOSIS — F41.9 ANXIETY: ICD-10-CM

## 2020-10-07 DIAGNOSIS — E66.01 OBESITY, MORBID (HCC): ICD-10-CM

## 2020-10-07 DIAGNOSIS — I10 BENIGN ESSENTIAL HTN: Primary | ICD-10-CM

## 2020-10-07 DIAGNOSIS — N94.6 DYSMENORRHEA: ICD-10-CM

## 2020-10-07 DIAGNOSIS — R00.0 TACHYCARDIA: ICD-10-CM

## 2020-10-07 DIAGNOSIS — R60.0 EDEMA OF BOTH LEGS: ICD-10-CM

## 2020-10-07 DIAGNOSIS — F40.298 SPECIFIC PHOBIA: ICD-10-CM

## 2020-10-07 PROCEDURE — 99214 OFFICE O/P EST MOD 30 MIN: CPT | Performed by: FAMILY MEDICINE

## 2020-10-07 RX ORDER — LISINOPRIL 10 MG/1
10 TABLET ORAL DAILY
Qty: 30 TAB | Refills: 1 | Status: SHIPPED | OUTPATIENT
Start: 2020-10-07 | End: 2020-11-05 | Stop reason: SDUPTHER

## 2020-10-07 NOTE — PROGRESS NOTES
Chief Complaint   Patient presents with    Hypertension     Follow up.  Establish Care     Wants to transfer from Dr Gustavo Parr. 1. Have you been to the ER, urgent care clinic since your last visit? Hospitalized since your last visit? No    2. Have you seen or consulted any other health care providers outside of the [address] [place] [address] since your last visit? Include any pap smears or colon screening.  No

## 2020-10-07 NOTE — PROGRESS NOTES
Carlos Manuel Gallardo is a 47 y.o. female who was seen by synchronous (real-time) audio-video technology on 10/7/2020. Consent: Carlos Manuel Gallardo, who was seen by synchronous (real-time) audio-video technology, and/or her healthcare decision maker, is aware that this patient-initiated, Telehealth encounter on 10/7/2020 is a billable service, with coverage as determined by her insurance carrier. She is aware that she may receive a bill and has provided verbal consent to proceed: Yes. Assessment & Plan:   1. Benign essential HTN  Not quite to goal  Add lisinopril 10, goal of combo pill with hctz to limit pill burden  C/w metoprolol and hctz  Recheck 1 m  May need f/u labs at that time for lytes  - lisinopriL (PRINIVIL, ZESTRIL) 10 mg tablet; Take 1 Tab by mouth daily. Dispense: 30 Tab; Refill: 1    2. Tachycardia  Stable, reviewed workup from [place] ([address]) seems benign, stable on Metoprolol, no change today    3. Edema of both legs  Was a s/e of amlodipine, off med and on hctz doing much better    4. Dysmenorrhea  Was amenorrheic for about 1 year, recently in light of weight gain started cycles again, has had 2 , first had a lot of heavy bleeding and dysmenorrhea, 2nd was OK and \"normal\" will watch  Due for pap, please schedule with Dr Sherri Dyson! 5. Obesity, morbid ([address]  GREAT WORK! Keep up the excellent work on weight loss through healthy eating, continued exercise    6. Anxiety  7. Specific phobia  Triggering event: highway driving, recommend therapy specifically geared towards confronting and extinguishing if possible. Pt understanding, would like to stay off meds for now, resource list sent through [employer]          Pt was counseled on risks, benefits and alternatives of treatment options. All questions were asked and answered and the patient was agreeable with the treatment plan as outlined.     Encounter time today was 28 minutes and more than 50% of this encounter was spent in counseling face-to-face regarding Diagnosis, Patient Education, Medication Management, Compliance and Impressions. Time documented includes face to face time, documentation and chart review if applicable except as noted. Subjective:   Rachel Vega is a 47 y.o. female who was seen for Hypertension (Follow up.) and Establish Care (Wants to transfer from  [facility].)    Coming off of amlodipine which was working but had side effect of LE swelling and HCTZ is helping with water retention but her bp is running a little less controlled than we would have hoped for    Weight; she is at 203, she's working hard on getting her weight off, last office visit she was 226. She's doing fasting, eating window is 11-6. She is eating low carb and doing really well with this. Enjoys horseback riding, did [employer] jumping, now it is a hobby    Anxiety: not on zoloft right now, doesn't want to take medications. She thinks she's having panic attacks when she's driving. She can't figure out why. She travels a lot with work, so driving is normal and it seems to be coming out of nowhere, it seems to stunt her social life (she feels like the interstate triggers it and she gets a feeling of overwhelmed)  She gets anxious too if someone else is driving    Dysmenorrhea: seeing Dr [facility], was having irregular periods, thought it was due to weight gain, had a period in August and it was heavy and uncomfortable, last month was \"normal\" for her and was 2-3 days and she is tracking her cycles. Most recent interval was 30 days  Right now using condoms for protection from pregnancy      Medications, allergies, PMH, PSH, SOCH, [employer] [employer] [facility] reviewed and updated per routine protocol, see chart for review and changes if not noted here. ROS  A 12 point review of systems was negative except as noted here or in the HPI.     Objective:   Vital Signs: (As obtained by patient/caregiver at home)  Patient-Reported Vitals 10/7/2020   Patient-Reported Weight 203lb Patient-Reported Height -   Patient-Reported Pulse 108   Patient-Reported Temperature -   Patient-Reported Systolic  938   Patient-Reported Diastolic 81   Patient-Reported LMP 09/18/2020        [INSTRUCTIONS:  \"[x]\" Indicates a positive item  \"[]\" Indicates a negative item  -- DELETE ALL ITEMS NOT EXAMINED]    Constitutional: [x] Appears well-developed and well-nourished [x] No apparent distress      [] Abnormal -     Mental status: [x] Alert and awake  [x] Oriented to person/place/time [x] Able to follow commands    [] Abnormal -     Eyes:   EOM    [x]  Normal    [] Abnormal -   Sclera  [x]  Normal    [] Abnormal -          Discharge [x]  None visible   [] Abnormal -     HENT: [x] Normocephalic, atraumatic  [] Abnormal -   [x] Mouth/Throat: Mucous membranes are moist    External Ears [x] Normal  [] Abnormal -    Neck: [x] No visualized mass [] Abnormal -     Pulmonary/Chest: [x] Respiratory effort normal   [x] No visualized signs of difficulty breathing or respiratory distress        [] Abnormal -      Musculoskeletal:   [] Normal gait with no signs of ataxia         [x] Normal range of motion of neck        [] Abnormal -     Neurological:        [x] No Facial Asymmetry (Cranial nerve 7 motor function) (limited exam due to video visit)          [x] No gaze palsy        [] Abnormal -          Skin:        [x] No significant exanthematous lesions or discoloration noted on facial skin         [] Abnormal -            Psychiatric:       [x] Normal Affect [] Abnormal -        [x] No Hallucinations    Other pertinent observable physical exam findings:well appearing, seated at desk, pleasant, normal mood    We discussed the expected course, resolution and complications of the diagnosis(es) in detail. Medication risks, benefits, costs, interactions, and alternatives were discussed as indicated. I advised her to contact the office if her condition worsens, changes or fails to improve as anticipated.  She expressed understanding with the diagnosis(es) and plan. Carlos Manuel Gallardo is a 47 y.o. female who was evaluated by a video visit encounter for concerns as above. Patient identification was verified prior to start of the visit. A caregiver was present when appropriate. Due to this being a TeleHealth encounter (During K-36 public health emergency), evaluation of the following organ systems was limited: Vitals/Constitutional/EENT/Resp/CV/GI//MS/Neuro/Skin/Heme-Lymph-Imm. Pursuant to the emergency declaration under the [address] [place] [address], [facility] waiver authority and the [employer] Act, this Virtual  Visit was conducted, with patient's (and/or legal guardian's) consent, to reduce the patient's risk of exposure to COVID-19 and provide necessary medical care. Services were provided through a video synchronous discussion virtually to substitute for in-person clinic visit. Patient and provider were located at their individual homes. Ursula Hansen MD  [facility]  10/07/20 8:17 AM     Portions of this note may have been populated using smart dictation software and may have \"sounds-like\" errors present.

## 2020-10-07 NOTE — TELEPHONE ENCOUNTER
----- Message from Soraya Adams MD sent at 10/7/2020  8:42 AM EDT -----  Regarding: schedule patient  Please put patient on for 9am on 11/5/20 for bp check  VV  She has a cuff

## 2020-10-21 DIAGNOSIS — I10 BENIGN ESSENTIAL HTN: ICD-10-CM

## 2020-10-22 RX ORDER — METOPROLOL SUCCINATE 200 MG/1
TABLET, EXTENDED RELEASE ORAL
Qty: 30 TAB | Refills: 0 | OUTPATIENT
Start: 2020-10-22

## 2020-10-23 DIAGNOSIS — I10 BENIGN ESSENTIAL HTN: ICD-10-CM

## 2020-10-23 RX ORDER — METOPROLOL SUCCINATE 200 MG/1
TABLET, EXTENDED RELEASE ORAL
Qty: 30 TAB | Refills: 3 | Status: SHIPPED | OUTPATIENT
Start: 2020-10-23 | End: 2021-02-15

## 2020-10-23 NOTE — TELEPHONE ENCOUNTER
----- Message from Eric Wood sent at 10/23/2020 10:09 AM EDT -----  Regarding: YUDELKA/TELEPHONE  Contact: [phone]  General Message/Vendor Calls    Caller's first and last name: Catrina Douglas      Reason for call:  Request for Metoprolol 200 mg      Callback required yes/no and why: Yes      Best contact number(s):[phone]      Details to clarify the request: Per patient she was denied a refill for Metoprolol 200 mg. Patient would like to know if she could get the medication call in to the pharmacy until her scheduled appt 11/05/20. Per patient she has 2 tables left.       Eric Wood

## 2020-10-26 RX ORDER — METOPROLOL SUCCINATE 200 MG/1
TABLET, EXTENDED RELEASE ORAL
Qty: 30 TAB | Refills: 3 | OUTPATIENT
Start: 2020-10-26

## 2020-11-05 ENCOUNTER — VIRTUAL VISIT (OUTPATIENT)
Dept: FAMILY MEDICINE CLINIC | Age: 40
End: 2020-11-05
Payer: COMMERCIAL

## 2020-11-05 ENCOUNTER — TELEPHONE (OUTPATIENT)
Dept: FAMILY MEDICINE CLINIC | Age: 40
End: 2020-11-05

## 2020-11-05 DIAGNOSIS — I10 BENIGN ESSENTIAL HTN: Primary | ICD-10-CM

## 2020-11-05 DIAGNOSIS — R60.0 EDEMA OF BOTH LEGS: ICD-10-CM

## 2020-11-05 PROCEDURE — 99213 OFFICE O/P EST LOW 20 MIN: CPT | Performed by: FAMILY MEDICINE

## 2020-11-05 RX ORDER — LISINOPRIL AND HYDROCHLOROTHIAZIDE 10; 12.5 MG/1; MG/1
1 TABLET ORAL DAILY
Qty: 90 TAB | Refills: 1 | Status: SHIPPED | OUTPATIENT
Start: 2020-11-05 | End: 2021-02-15 | Stop reason: SDUPTHER

## 2020-11-05 NOTE — PROGRESS NOTES
Dennys Prater is a 47 y.o. female who was seen by synchronous (real-time) audio-video technology on 11/5/2020. Consent: Dennys Prater, who was seen by synchronous (real-time) audio-video technology, and/or her healthcare decision maker, is aware that this patient-initiated, Telehealth encounter on 11/5/2020 is a billable service, with coverage as determined by her insurance carrier. She is aware that she may receive a bill and has provided verbal consent to proceed: Yes. Assessment & Plan:   1. Benign essential HTN  Doing well  Change to lisinopril-hctz combo  Recheck 3-4 m in person    2. Edema of both legs  Improved, doing well          Pt was counseled on risks, benefits and alternatives of treatment options. All questions were asked and answered and the patient was agreeable with the treatment plan as outlined. Encounter time today was 17 minutes and more than 50% of this encounter was spent in counseling face-to-face regarding Diagnosis, Patient Education, Medication Management, Compliance and Impressions. Time documented includes face to face time, documentation and chart review if applicable except as noted. Subjective:   Dennys Prater is a 47 y.o. female who was seen for Hypertension (check )      HTN: patient reports stable on medications. No chest pain, sob, headache, blurred vision, leg swelling, diaphoresis, falls. Compliant with medications as prescribed. is checking blood pressures at home and reports range is 100-120/60-70s. No significant hyper or hypotensive episodes that the patient reports today. FEELS GOOD! Medications, allergies, PMH, PSH, SOCH, [employer] [facility] reviewed and updated per routine protocol, see chart for review and changes if not noted here. ROS  A 12 point review of systems was negative except as noted here or in the HPI.     Objective:   Vital Signs: (As obtained by patient/caregiver at home)  Patient-Reported Vitals 11/5/2020   Patient-Reported Weight 196lb Patient-Reported Height -   Patient-Reported Pulse 94   Patient-Reported Temperature 98.2   Patient-Reported Systolic  -   Patient-Reported Diastolic -   Patient-Reported LMP -        [INSTRUCTIONS:  \"[x]\" Indicates a positive item  \"[]\" Indicates a negative item  -- DELETE ALL ITEMS NOT EXAMINED]    Constitutional: [x] Appears well-developed and well-nourished [x] No apparent distress      [] Abnormal -     Mental status: [x] Alert and awake  [x] Oriented to person/place/time [x] Able to follow commands    [] Abnormal -     Eyes:   EOM    [x]  Normal    [] Abnormal -   Sclera  [x]  Normal    [] Abnormal -          Discharge [x]  None visible   [] Abnormal -     HENT: [x] Normocephalic, atraumatic  [] Abnormal -   [x] Mouth/Throat: Mucous membranes are moist    External Ears [x] Normal  [] Abnormal -    Neck: [x] No visualized mass [] Abnormal -     Pulmonary/Chest: [x] Respiratory effort normal   [x] No visualized signs of difficulty breathing or respiratory distress        [] Abnormal -      Musculoskeletal:   [] Normal gait with no signs of ataxia         [x] Normal range of motion of neck        [] Abnormal -     Neurological:        [x] No Facial Asymmetry (Cranial nerve 7 motor function) (limited exam due to video visit)          [x] No gaze palsy        [] Abnormal -          Skin:        [x] No significant exanthematous lesions or discoloration noted on facial skin         [] Abnormal -            Psychiatric:       [x] Normal Affect [] Abnormal -        [x] No Hallucinations    Other pertinent observable physical exam findings:seated at desk, well appearing    We discussed the expected course, resolution and complications of the diagnosis(es) in detail. Medication risks, benefits, costs, interactions, and alternatives were discussed as indicated. I advised her to contact the office if her condition worsens, changes or fails to improve as anticipated.  She expressed understanding with the diagnosis(es) and plan. Anahi Lu is a 47 y.o. female who was evaluated by a video visit encounter for concerns as above. Patient identification was verified prior to start of the visit. A caregiver was present when appropriate. Due to this being a TeleHealth encounter (During OMJZS-14 public health emergency), evaluation of the following organ systems was limited: Vitals/Constitutional/EENT/Resp/CV/GI//MS/Neuro/Skin/Heme-Lymph-Imm. Pursuant to the emergency declaration under the [address] [place] [address], Replaced by [facility] waiver authority and the [employer] Act, this Virtual  Visit was conducted, with patient's (and/or legal guardian's) consent, to reduce the patient's risk of exposure to COVID-19 and provide necessary medical care. Services were provided through a video synchronous discussion virtually to substitute for in-person clinic visit. Patient and provider were located at their individual homes. Angel Sherman MD  [facility]  11/05/20 9:07 AM     Portions of this note may have been populated using smart dictation software and may have \"sounds-like\" errors present.

## 2020-11-05 NOTE — TELEPHONE ENCOUNTER
----- Message from Dora Narvaez MD sent at 11/5/2020  9:16 AM EST -----  Regarding: dena hummel Pls put patient on for 3/5/21 at 830 am for a physical and bp recheck  IN OFFICE!

## 2020-11-05 NOTE — PROGRESS NOTES
Radha Lemon is a 47 y.o. female    Chief Complaint   Patient presents with    Hypertension     check    BP reading today 108/74    Health Maintenance Due   Topic Date Due    DTaP/Tdap/Td series (1 - Tdap) 09/23/2003    PAP AKA CERVICAL CYTOLOGY  12/01/2017    Flu Vaccine (1) 09/01/2020       There were no vitals taken for this visit. 3 most recent PHQ Screens 10/18/2019   Little interest or pleasure in doing things Not at all   Feeling down, depressed, irritable, or hopeless Not at all   Total Score PHQ 2 0       No flowsheet data found. No flowsheet data found. 1. Have you been to the ER, urgent care clinic since your last visit? Hospitalized since your last visit?no    2. Have you seen or consulted any other health care providers outside of the [address] [place] [address] since your last visit? Include any pap smears or colon screening.  no

## 2020-12-09 NOTE — PROGRESS NOTES
Jeronimo Valle is a [de-identified] ,  47 y.o. female [facility] whose Patient's last menstrual period was 11/25/2020. was on 11/25/2020 who presents for her annual checkup. She would like to discuss birth control options. She has only tried OCP in the past. Interested in IUD. With regard to the Gardisil vaccine, she is older than the FDA approved age to receive it. Menstrual status:    Her periods are light, moderate in flow. She is using three to five pads or tampons per day, usually regular and last 26-30 days. She denies dysmenorrhea. She reports no premenstrual symptoms. Contraception:    The current method of family planning is none. Previously on OCP but stopped d/t elevated BP. Sexual history:    She  reports being sexually active and has had partner(s) who are Male. She reports using the following method of birth control/protection: Pill. Medical conditions:    Since her last annual GYN exam about three or more years ago, she has not the following changes in her health history: none. Pap and Mammogram History:    Her most recent Pap smear was 12/1/2014 rare ASCUSl/HPV + 18+ - no colpo done    The patient has never had a mammogram.    The patient does not have a family history of breast cancer. Past Medical History:   Diagnosis Date    Benign essential HTN 7/10/2019    Dysmenorrhea 8/19/2016    Edema of both legs 7/31/2014    Hypertension      Past Surgical History:   Procedure Laterality Date    HX HEENT      tonsillectomy       Current Outpatient Medications   Medication Sig Dispense Refill    lisinopril-hydroCHLOROthiazide (PRINZIDE, ZESTORETIC) 10-12.5 mg per tablet Take 1 Tab by mouth daily.  90 Tab 1    metoprolol succinate (TOPROL-XL) 200 mg XL tablet TAKE 1 TABLET BY MOUTH EVERY DAY 30 Tab 3     Allergies: Norvasc [amlodipine] and Pcn [penicillins]   Social History     Socioeconomic History    Marital status: SINGLE     Spouse name: Not on file    Number of children: Not on file    Years of education: Not on file    Highest education level: Not on file   Occupational History    Not on file   Social Needs    Financial resource strain: Not on file    Food insecurity     Worry: Not on file     Inability: Not on file    Transportation needs     Medical: Not on file     Non-medical: Not on file   Tobacco Use    Smoking status: Never Smoker    Smokeless tobacco: Never Used   Substance and Sexual Activity    Alcohol use: Yes    Drug use: No    Sexual activity: Yes     Partners: Male     Birth control/protection: Pill   Lifestyle    Physical activity     Days per week: Not on file     Minutes per session: Not on file    Stress: Not on file   Relationships    Social connections     Talks on phone: Not on file     Gets together: Not on file     Attends Anabaptism service: Not on file     Active member of club or organization: Not on file     Attends meetings of clubs or organizations: Not on file     Relationship status: Not on file    Intimate partner violence     Fear of current or ex partner: Not on file     Emotionally abused: Not on file     Physically abused: Not on file     Forced sexual activity: Not on file   Other Topics Concern    Not on file   Social History Narrative    Not on file     Tobacco History:  reports that she has never smoked. She has never used smokeless tobacco.  Alcohol Abuse:  reports current alcohol use. Drug Abuse:  reports no history of drug use.     Patient Active Problem List   Diagnosis Code    Edema of both legs R60.0    Dysmenorrhea N94.6    Obesity, morbid ([address] E66.01    Benign essential HTN I10    Specific phobia F40.298       Review of Systems - History obtained from the patient  Constitutional: negative for weight loss, fever, night sweats  HEENT: negative for hearing loss, earache, congestion, snoring, sorethroat  CV: negative for chest pain, palpitations, edema  Resp: negative for cough, shortness of breath, wheezing  GI: negative for change in bowel habits, abdominal pain, black or bloody stools  : negative for frequency, dysuria, hematuria, vaginal discharge  MSK: negative for back pain, joint pain, muscle pain  Breast: negative for breast lumps, nipple discharge, galactorrhea  Skin :negative for itching, rash, hives  Neuro: negative for dizziness, headache, confusion, weakness  Psych: negative for anxiety, depression, change in mood  Heme/lymph: negative for bleeding, bruising, pallor    Physical Exam    Visit Vitals  /72   Ht 5' (1.524 m)   Wt 196 lb 3.2 oz (89 kg)   LMP 11/25/2020   BMI 38.32 kg/m²       Constitutional  · Appearance: well-nourished, well developed, alert, in no acute distress    HENT  · Head and Face: appears normal    Neck  · Inspection/Palpation: normal appearance, no masses or tenderness  · Lymph Nodes: no lymphadenopathy present  · Thyroid: gland size normal, nontender, no nodules or masses present on palpation    Chest  · Respiratory Effort: breathing normal  · Auscultation: normal breath sounds    Cardiovascular  · Heart:  · Auscultation: regular rate and rhythm without murmur    Breasts  · Inspection of Breasts: breasts symmetrical, no skin changes, no discharge present, nipple appearance normal, no skin retraction present  · Palpation of Breasts and Axillae: no masses present on palpation, no breast tenderness  · Axillary Lymph Nodes: no lymphadenopathy present    Gastrointestinal  · Abdominal Examination: abdomen non-tender to palpation, normal bowel sounds, no masses present  · Liver and spleen: no hepatomegaly present, spleen not palpable  · Hernias: no hernias identified    Genitourinary  · External Genitalia: normal appearance for age, no discharge present, no tenderness present, no inflammatory lesions present, no masses present, no atrophy present  · Vagina: normal vaginal vault without central or paravaginal defects, no discharge present, no inflammatory lesions present, no masses present  · Bladder: non-tender to palpation  · Urethra: appears normal  · Cervix: normal   · Uterus: normal size, shape and consistency  · Adnexa: no adnexal tenderness present, no adnexal masses present  · Perineum: perineum within normal limits, no evidence of trauma, no rashes or skin lesions present  · Anus: anus within normal limits, no hemorrhoids present  · Inguinal Lymph Nodes: no lymphadenopathy present    Skin  · General Inspection: no rash, no lesions identified    Neurologic/Psychiatric  · Mental Status:  · Orientation: grossly oriented to person, place and time  · Mood and Affect: mood normal, affect appropriate    . Assessment:  Routine gynecologic examination  Her current medical status is satisfactory with no evidence of significant gynecologic issues.   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

## 2020-12-10 ENCOUNTER — OFFICE VISIT (OUTPATIENT)
Dept: OBGYN CLINIC | Age: 40
End: 2020-12-10
Payer: COMMERCIAL

## 2020-12-10 VITALS
SYSTOLIC BLOOD PRESSURE: 117 MMHG | WEIGHT: 196.2 LBS | HEIGHT: 60 IN | DIASTOLIC BLOOD PRESSURE: 72 MMHG | BODY MASS INDEX: 38.52 KG/M2

## 2020-12-10 DIAGNOSIS — Z11.51 SCREENING FOR HPV (HUMAN PAPILLOMAVIRUS): ICD-10-CM

## 2020-12-10 DIAGNOSIS — Z01.419 ENCNTR FOR GYN EXAM (GENERAL) (ROUTINE) W/O ABN FINDINGS: Primary | ICD-10-CM

## 2020-12-10 PROCEDURE — 99395 PREV VISIT EST AGE 18-39: CPT | Performed by: OBSTETRICS & GYNECOLOGY

## 2020-12-10 RX ORDER — NORETHINDRONE ACETATE AND ETHINYL ESTRADIOL 1MG-20(24)
1 KIT ORAL DAILY
Qty: 3 PACKAGE | Refills: 4 | Status: SHIPPED | OUTPATIENT
Start: 2020-12-10 | End: 2021-04-23

## 2020-12-10 NOTE — PATIENT INSTRUCTIONS
Well Visit, Ages 25 to 48: Care Instructions Your Care Instructions Physical exams can help you stay healthy. Your doctor has checked your overall health and may have suggested ways to take good care of yourself. He or she also may have recommended tests. At home, you can help prevent illness with healthy eating, regular exercise, and other steps. Follow-up care is a key part of your treatment and safety. Be sure to make and go to all appointments, and call your doctor if you are having problems. It's also a good idea to know your test results and keep a list of the medicines you take. How can you care for yourself at home? · Reach and stay at a healthy weight. This will lower your risk for many problems, such as obesity, diabetes, heart disease, and high blood pressure. · Get at least 30 minutes of physical activity on most days of the week. Walking is a good choice. You also may want to do other activities, such as running, swimming, cycling, or playing tennis or team sports. Discuss any changes in your exercise program with your doctor. · Do not smoke or allow others to smoke around you. If you need help quitting, talk to your doctor about stop-smoking programs and medicines. These can increase your chances of quitting for good. · Talk to your doctor about whether you have any risk factors for sexually transmitted infections (STIs). Having one sex partner (who does not have STIs and does not have sex with anyone else) is a good way to avoid these infections. · Use birth control if you do not want to have children at this time. Talk with your doctor about the choices available and what might be best for you. · Protect your skin from too much sun. When you're outdoors from 10 a.m. to 4 p.m., stay in the shade or cover up with clothing and a hat with a wide brim. Wear sunglasses that block UV rays. Even when it's cloudy, put broad-spectrum sunscreen (SPF 30 or higher) on any exposed skin. · See a dentist one or two times a year for checkups and to have your teeth cleaned. · Wear a seat belt in the car. Follow your doctor's advice about when to have certain tests. These tests can spot problems early. For everyone · Cholesterol. Have the fat (cholesterol) in your blood tested after age 21. Your doctor will tell you how often to have this done based on your age, family history, or other things that can increase your risk for heart disease. · Blood pressure. Have your blood pressure checked during a routine doctor visit. Your doctor will tell you how often to check your blood pressure based on your age, your blood pressure results, and other factors. · Vision. Talk with your doctor about how often to have a glaucoma test. 
· Diabetes. Ask your doctor whether you should have tests for diabetes. · Colon cancer. Your risk for colorectal cancer gets higher as you get older. Some experts say that adults should start regular screening at age 48 and stop at age 76. Others say to start before age 48 or continue after age 76. Talk with your doctor about your risk and when to start and stop screening. For women · Breast exam and mammogram. Talk to your doctor about when you should have a clinical breast exam and a mammogram. Medical experts differ on whether and how often women under 50 should have these tests. Your doctor can help you decide what is right for you. · Cervical cancer screening test and pelvic exam. Begin with a Pap test at age 24. The test often is part of a pelvic exam. Starting at age 27, you may choose to have a Pap test, an HPV test, or both tests at the same time (called co-testing). Talk with your doctor about how often to have testing. · Tests for sexually transmitted infections (STIs). Ask whether you should have tests for STIs. You may be at risk if you have sex with more than one person, especially if your partners do not wear condoms. For men · Tests for sexually transmitted infections (STIs). Ask whether you should have tests for STIs. You may be at risk if you have sex with more than one person, especially if you do not wear a condom. · Testicular cancer exam. Ask your doctor whether you should check your testicles regularly. · Prostate exam. Talk to your doctor about whether you should have a blood test (called a PSA test) for prostate cancer. Experts differ on whether and when men should have this test. Some experts suggest it if you are older than 39 and are -American or have a father or brother who got prostate cancer when he was younger than 72. When should you call for help? Watch closely for changes in your health, and be sure to contact your doctor if you have any problems or symptoms that concern you. Where can you learn more? Go to http://www.lockhart.com/ Enter P072 in the search box to learn more about \"Well Visit, Ages 25 to 48: Care Instructions. \" Current as of: May 27, 2020               Content Version: 12.6 © 2006-2020 [employer]. Care instructions adapted under license by [employer] (which disclaims liability or warranty for this information). If you have questions about a medical condition or this instruction, always ask your healthcare professional. [address] any warranty or liability for your use of this information.

## 2020-12-16 LAB
CYTOLOGIST CVX/VAG CYTO: ABNORMAL
CYTOLOGY CVX/VAG DOC CYTO: ABNORMAL
CYTOLOGY CVX/VAG DOC THIN PREP: ABNORMAL
CYTOLOGY HISTORY:: ABNORMAL
DX ICD CODE: ABNORMAL
DX ICD CODE: ABNORMAL
HPV I/H RISK 1 DNA CVX QL PROBE+SIG AMP: POSITIVE
Lab: ABNORMAL
OTHER STN SPEC: ABNORMAL
PATHOLOGIST CVX/VAG CYTO: ABNORMAL
STAT OF ADQ CVX/VAG CYTO-IMP: ABNORMAL

## 2021-01-28 NOTE — PROGRESS NOTES
Patient notified of pap results through baseclick. Tickled for end of February, sent to Roddy. Sent patient a baseclick message to schedule colpo.

## 2021-02-14 DIAGNOSIS — I10 BENIGN ESSENTIAL HTN: ICD-10-CM

## 2021-02-14 RX ORDER — HYDROCHLOROTHIAZIDE 25 MG/1
TABLET ORAL
Qty: 30 TAB | Refills: 0 | OUTPATIENT
Start: 2021-02-14

## 2021-02-15 RX ORDER — METOPROLOL SUCCINATE 200 MG/1
TABLET, EXTENDED RELEASE ORAL
Qty: 30 TAB | Refills: 3 | Status: SHIPPED | OUTPATIENT
Start: 2021-02-15 | End: 2021-06-11

## 2021-02-15 RX ORDER — LISINOPRIL AND HYDROCHLOROTHIAZIDE 10; 12.5 MG/1; MG/1
1 TABLET ORAL DAILY
Qty: 90 TAB | Refills: 1 | Status: SHIPPED | OUTPATIENT
Start: 2021-02-15 | End: 2021-05-17 | Stop reason: SDUPTHER

## 2021-04-01 ENCOUNTER — OFFICE VISIT (OUTPATIENT)
Dept: FAMILY MEDICINE CLINIC | Age: 41
End: 2021-04-01
Payer: COMMERCIAL

## 2021-04-01 VITALS
OXYGEN SATURATION: 97 % | TEMPERATURE: 98.1 F | BODY MASS INDEX: 38.08 KG/M2 | RESPIRATION RATE: 18 BRPM | DIASTOLIC BLOOD PRESSURE: 88 MMHG | WEIGHT: 195 LBS | SYSTOLIC BLOOD PRESSURE: 128 MMHG

## 2021-04-01 DIAGNOSIS — Z11.59 ENCOUNTER FOR HEPATITIS C SCREENING TEST FOR LOW RISK PATIENT: ICD-10-CM

## 2021-04-01 DIAGNOSIS — E66.01 OBESITY, MORBID (HCC): ICD-10-CM

## 2021-04-01 DIAGNOSIS — K21.9 GASTROESOPHAGEAL REFLUX DISEASE WITHOUT ESOPHAGITIS: ICD-10-CM

## 2021-04-01 DIAGNOSIS — Z00.00 WELL WOMAN EXAM (NO GYNECOLOGICAL EXAM): Primary | ICD-10-CM

## 2021-04-01 DIAGNOSIS — D23.4 DERMOID CYST OF SCALP: ICD-10-CM

## 2021-04-01 DIAGNOSIS — Z13.220 SCREENING FOR LIPID DISORDERS: ICD-10-CM

## 2021-04-01 DIAGNOSIS — R14.2 BELCHING: ICD-10-CM

## 2021-04-01 DIAGNOSIS — I10 BENIGN ESSENTIAL HTN: ICD-10-CM

## 2021-04-01 DIAGNOSIS — R73.01 IFG (IMPAIRED FASTING GLUCOSE): ICD-10-CM

## 2021-04-01 PROCEDURE — 99214 OFFICE O/P EST MOD 30 MIN: CPT | Performed by: FAMILY MEDICINE

## 2021-04-01 PROCEDURE — 99395 PREV VISIT EST AGE 18-39: CPT | Performed by: FAMILY MEDICINE

## 2021-04-01 RX ORDER — OMEPRAZOLE 40 MG/1
40 CAPSULE, DELAYED RELEASE ORAL DAILY
Qty: 90 CAP | Refills: 0 | Status: SHIPPED | OUTPATIENT
Start: 2021-04-01 | End: 2021-06-28

## 2021-04-01 NOTE — PROGRESS NOTES
Chief Complaint   Patient presents with    Complete Physical     Knot on top of head, painless.  Hypertension     Follow up     1. Have you been to the ER, urgent care clinic since your last visit? Hospitalized since your last visit? No    2. Have you seen or consulted any other health care providers outside of the [address] [place] [address] since your last visit? Include any pap smears or colon screening.  No

## 2021-04-01 NOTE — PROGRESS NOTES
Subjective:   47 y.o. female for Well Woman Check. Her gyne and breast care is done elsewhere by her Ob-Gyne physician. 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 90 Cap 0    metoprolol succinate (TOPROL-XL) 200 mg XL tablet TAKE 1 TABLET BY MOUTH EVERY DAY 30 Tab 3    lisinopril-hydroCHLOROthiazide (PRINZIDE, ZESTORETIC) 10-12.5 mg per tablet Take 1 Tab by mouth daily. 90 Tab 1    norethindrone-e estradiol-iron (Blisovi 24 Fe) 1 mg-20 mcg (24)/75 mg (4) tab Take 1 Tab by mouth daily. 3 Package 4     Allergies   Allergen Reactions    Norvasc [Amlodipine] Swelling    Pcn [Penicillins] Hives     Past Medical History:   Diagnosis Date    Abnormal Pap smear of cervix 12/2020    LGSIL/+HPV    Benign essential HTN 7/10/2019    Dysmenorrhea 8/19/2016    Edema of both legs 7/31/2014    Hypertension      Past Surgical History:   Procedure Laterality Date    HX HEENT      tonsillectomy     Family History   Problem Relation Age of Onset    Hypertension Mother      Social History     Tobacco Use    Smoking status: Never Smoker    Smokeless tobacco: Never Used   Substance Use Topics    Alcohol use: Yes             ROS: Feeling generally well. No TIA's or unusual headaches, no dysphagia. No prolonged cough. No dyspnea or chest pain on exertion. No abdominal pain, change in bowel habits, black or bloody stools. No urinary tract symptoms. No new or unusual musculoskeletal symptoms. Specific concerns today:  Follow up on bp  HTN: patient reports stable on medications. No chest pain, sob, headache, blurred vision, leg swelling, diaphoresis, falls. Compliant with medications as prescribed.  is checking blood pressures at home and reports range is 110-130/70. No significant hyper or hypotensive episodes that the patient reports today. BELCHING--taking gasx every morning, burning in back of throat as well  No early satiety, doesn't eat regularly during the day though    Objective: The patient appears well, alert, oriented x 3, in no distress. Visit Vitals  /88 (BP 1 Location: Left arm, BP Patient Position: Sitting, BP Cuff Size: Adult)   Temp 98.1 °F (36.7 °C)   Resp 18   Wt 195 lb (88.5 kg)   LMP 03/18/2021   SpO2 97%   BMI 38.08 kg/m²     ENT normal.  Neck supple. No adenopathy or thyromegaly. ALMA. Lungs are clear, good air entry, no wheezes, rhonchi or rales. S1 and S2 normal, no murmurs, regular rate and rhythm. Abdomen soft without tenderness, guarding, mass or organomegaly. Extremities show no edema, normal peripheral pulses. Neurological is normal, no focal findings. Breast and Pelvic exams are deferred. Scalp: 1cm x 1.2 cm cyst R occiput, non tender    Assessment/Plan:   Well Woman  lose weight, increase physical activity, continue present plan, routine labs ordered, call if any problems  1. Well woman exam (no gynecological exam)  Well woman, following also with Dr Marychuy Felton, pending colpo  - CBC W/O DIFF; Future  - METABOLIC PANEL, COMPREHENSIVE; Future  - LIPID PANEL; Future  - HEMOGLOBIN A1C WITH EAG; Future  - HEPATITIS C AB; Future    2. Dermoid cyst of scalp  Likely benign, will refer to derm, bothers patient and it hits things when hair brushing, would like removed  - REFERRAL TO DERMATOLOGY    3. Benign essential HTN  Great control, labs per orders, keep up the good work  - CBC W/O DIFF; Future  - METABOLIC PANEL, COMPREHENSIVE; Future    4. Obesity, morbid ([address]  Working on getting back to IF and also increasing activity with weather changes    5. Encounter for hepatitis C screening test for low risk patient  Routine screening  - HEPATITIS C AB; Future    6.  IFG (impaired fasting glucose)  Check labs  - CBC W/O DIFF; Future  - METABOLIC PANEL, COMPREHENSIVE; Future  - HEMOGLOBIN A1C WITH EAG; Future    7. Screening for lipid disorders  Fasting lipid paenl  - METABOLIC PANEL, COMPREHENSIVE; Future  - LIPID PANEL; Future    8. Gastroesophageal reflux disease without esophagitis  Trial PPI, prilosec, ok otc, will try sending rx strength if covered, take 27 m before meal on empty stomach once daily  - omeprazole (PRILOSEC) 40 mg capsule; Take 1 Cap by mouth daily. Dispense: 90 Cap; Refill: 0    9. Belching  As above  - omeprazole (PRILOSEC) 40 mg capsule; Take 1 Cap by mouth daily. Dispense: 90 Cap;  Refill: 0

## 2021-04-09 ENCOUNTER — OFFICE VISIT (OUTPATIENT)
Dept: OBGYN CLINIC | Age: 41
End: 2021-04-09
Payer: COMMERCIAL

## 2021-04-09 VITALS — HEIGHT: 60 IN | WEIGHT: 190 LBS | BODY MASS INDEX: 37.3 KG/M2

## 2021-04-09 DIAGNOSIS — R87.612 LGSIL ON PAP SMEAR OF CERVIX: Primary | ICD-10-CM

## 2021-04-09 DIAGNOSIS — R87.618 PAP SMEAR ABNORMALITY OF CERVIX/HUMAN PAPILLOMAVIRUS (HPV) POSITIVE: ICD-10-CM

## 2021-04-09 PROCEDURE — 57454 BX/CURETT OF CERVIX W/SCOPE: CPT | Performed by: OBSTETRICS & GYNECOLOGY

## 2021-04-09 NOTE — PROGRESS NOTES
[facility]  OFFICE PROCEDURE PROGRESS NOTE        Chart reviewed for the following:   Maxime BUCHANAN LPN, have reviewed the History, Physical and updated the Allergic reactions for Dieudonnetwyla Madera performed immediately prior to start of procedure:   Maxime BUCHANAN LPN, have performed the following reviews on Ashly Wood prior to the start of the procedure:            * Patient was identified by name and date of birth   * Agreement on procedure being performed was verified  * Risks and Benefits explained to the patient  * Consent was signed and verified     Time: 2:09 PM      Date of procedure: 4/9/2021    Procedure performed by:  Mary Sheth MD    How tolerated by patient: tolerated the procedure well with no complications    Post Procedural Pain Scale: 2 - Hurts Little Bit    Comments: none    Procedure Note: Colposcopy    Ashly Wood is a [de-identified] ,  47 y.o. female [address] whose Patient's last menstrual period was 03/18/2021. was on . She presents for colposcopy for evaluation of a cervical abnormality with a pap smear abnormality consisting of LGSIL/+HPV. After being presented with the risks, benefits and alternatives has she signed a consent for the procedure. She states that she understands the need for the procedure and has no further questions. She was informed that she may experience discomfort. Procedure:  She was positioned in the dorsal lithotomy position and a speculum was inserted into the vagina. Dilute acetic acid was applied to the cervix. The colposcope was used to visualize the cervix. Procedure: The transformation zone was completely visualized. Findings: This colposcopy was satisfactory. Biopsies were taken from the cervix, placed in formalin, and sent to pathology. See accompanying diagram for biopsy sites. Monsels was applied to the cervix. Endocervical currettage: An endocervical curettage was performed.  Findings:The procedure was notable for white epithelium on the cervix. Post Procedure Status: The patient tolerated the procedure well with minimal discomfort.            A: LGSIL  P: looks low grade  LEEP if indicated

## 2021-04-13 ENCOUNTER — HOSPITAL ENCOUNTER (OUTPATIENT)
Dept: LAB | Age: 41
Discharge: HOME | End: 2021-04-13

## 2021-04-14 ENCOUNTER — OFFICE VISIT (OUTPATIENT)
Dept: OBGYN CLINIC | Age: 41
End: 2021-04-14
Payer: COMMERCIAL

## 2021-04-14 VITALS — DIASTOLIC BLOOD PRESSURE: 79 MMHG | WEIGHT: 191 LBS | BODY MASS INDEX: 37.3 KG/M2 | SYSTOLIC BLOOD PRESSURE: 124 MMHG

## 2021-04-14 DIAGNOSIS — R35.0 URINARY FREQUENCY: ICD-10-CM

## 2021-04-14 DIAGNOSIS — R39.15 URINARY URGENCY: ICD-10-CM

## 2021-04-14 DIAGNOSIS — R30.0 DYSURIA: Primary | ICD-10-CM

## 2021-04-14 DIAGNOSIS — R39.11 URINARY HESITANCY: ICD-10-CM

## 2021-04-14 PROCEDURE — 99213 OFFICE O/P EST LOW 20 MIN: CPT | Performed by: OBSTETRICS & GYNECOLOGY

## 2021-04-14 RX ORDER — NITROFURANTOIN 25; 75 MG/1; MG/1
100 CAPSULE ORAL 2 TIMES DAILY
Qty: 14 CAP | Refills: 0 | Status: SHIPPED | OUTPATIENT
Start: 2021-04-14 | End: 2021-04-21

## 2021-04-14 NOTE — PROGRESS NOTES
UTI note Jade Rios is a [de-identified] P5,  47 y.o. female WHITE who presents today with had no chief complaint listed for this encounter. . Her symptoms started {numbers; 0-10:35938} {time units:11} ago, {clinical course - history:17::\"unchanged\"} since that time. Discomfort is in the suprapubic area and does not radiate. Symptoms are not alleviated by hydration. Symptoms are exacerbated with activity. Patient's other complaints: {ros uti:79485}. Her symptoms are moderate. Patient denies {ros QRE:40876}. There is not any concern of sexual abuse. There is not a history of trauma to the genital area. Patient {does/does not:42088} have a history of recurrent UTI. She does not have a history of pyelonephritis. She has a history of  has a past medical history of Abnormal Pap smear of cervix (12/2020), Benign essential HTN (7/10/2019), Dysmenorrhea (8/19/2016), Edema of both legs (7/31/2014), and Hypertension. with the following surgical history  has a past surgical history that includes hx heent. . 
. She {has/has not:91748} been evaluated for her current complaints. ***. Last urinalysis results are: 
 
Urine dipstick shows {ua dip:444444::\"negative for all components\"}. No results found for this or any previous visit. Past Medical History:  
Diagnosis Date  Abnormal Pap smear of cervix 12/2020 LGSIL/+HPV  Benign essential HTN 7/10/2019  Dysmenorrhea 8/19/2016  Edema of both legs 7/31/2014  Hypertension Past Surgical History:  
Procedure Laterality Date  HX HEENT    
 tonsillectomy Social History Occupational History  Not on file Tobacco Use  Smoking status: Never Smoker  Smokeless tobacco: Never Used Substance and Sexual Activity  Alcohol use: Yes  Drug use: No  
 Sexual activity: Yes  
  Partners: Male Birth control/protection: Pill Family History Problem Relation Age of Onset  Hypertension Mother Allergies Allergen Reactions  Norvasc [Amlodipine] Swelling  Pcn [Penicillins] Hives Prior to Admission medications Medication Sig Start Date End Date Taking? Authorizing Provider  
omeprazole (PRILOSEC) 40 mg capsule Take 1 Cap by mouth daily. 4/1/21   Julia Hill MD  
metoprolol succinate (TOPROL-XL) 200 mg XL tablet TAKE 1 TABLET BY MOUTH EVERY DAY 2/15/21   Julia Hill MD  
lisinopril-hydroCHLOROthiazide (PRINZIDE, ZESTORETIC) 10-12.5 mg per tablet Take 1 Tab by mouth daily. 2/15/21   Julia Hill MD  
norethindrone-e estradiol-iron (Blisovi 24 Fe) 1 mg-20 mcg (24)/75 mg (4) tab Take 1 Tab by mouth daily. 12/10/20   Lasha Cornelius MD  
  
 
Review of Systems: History obtained from the patient Constitutional: negative for weight loss, fever, night sweats Breast: negative for breast lumps, nipple discharge, galactorrhea GI: negative for change in bowel habits, abdominal pain, black or bloody stools : see HPI, negative for vaginal discharge MSK: negative for back pain, joint pain, muscle pain Skin: negative for itching, rash, hives Psych: negative for anxiety, depression, change in mood Objective: 
Visit Vitals LMP 03/18/2021 Physical Exam: PHYSICAL EXAMINATION Constitutional 
· Appearance: well-nourished, well developed, alert, in no acute distress Gastrointestinal 
· Abdominal Examination: abdomen non-tender to palpation, normal bowel sounds, no masses present · Liver and spleen: no hepatomegaly present, spleen not palpable · Hernias: no hernias identified Genitourinary · External Genitalia: normal appearance for age, no discharge present, no tenderness present, no inflammatory lesions present, no masses present, no atrophy present · Vagina: normal vaginal vault without central or paravaginal defects, no discharge present, no inflammatory lesions present, no masses present · Bladder: tender to palpation · Urethra: appears normal 
· Cervix: normal  
· Uterus: normal size, shape and consistency · Adnexa: no adnexal tenderness present, no adnexal masses present · Perineum: perineum within normal limits, no evidence of trauma, no rashes or skin lesions present · Anus: anus within normal limits, no hemorrhoids present · Inguinal Lymph Nodes: no lymphadenopathy present Skin · General Inspection: no rash, no lesions identified Neurologic/Psychiatric · Mental Status: · Orientation: grossly oriented to person, place and time · Mood and Affect: mood normal, affect appropriate Assessment: UTI Plan: Rx:

## 2021-04-14 NOTE — PATIENT INSTRUCTIONS
Urinary Tract Infection (UTI) in Women: Care Instructions Overview A urinary tract infection, or UTI, is a general term for an infection anywhere between the kidneys and the urethra (where urine comes out). Most UTIs are bladder infections. They often cause pain or burning when you urinate. UTIs are caused by bacteria and can be cured with antibiotics. Be sure to complete your treatment so that the infection does not get worse. Follow-up care is a key part of your treatment and safety. Be sure to make and go to all appointments, and call your doctor if you are having problems. It's also a good idea to know your test results and keep a list of the medicines you take. How can you care for yourself at home? · Take your antibiotics as directed. Do not stop taking them just because you feel better. You need to take the full course of antibiotics. · Drink extra water and other fluids for the next day or two. This will help make the urine less concentrated and help wash out the bacteria that are causing the infection. (If you have kidney, heart, or liver disease and have to limit fluids, talk with your doctor before you increase the amount of fluids you drink.) · Avoid drinks that are carbonated or have caffeine. They can irritate the bladder. · Urinate often. Try to empty your bladder each time. · To relieve pain, take a hot bath or lay a heating pad set on low over your lower belly or genital area. Never go to sleep with a heating pad in place. To prevent UTIs · Drink plenty of water each day. This helps you urinate often, which clears bacteria from your system. (If you have kidney, heart, or liver disease and have to limit fluids, talk with your doctor before you increase the amount of fluids you drink.) · Urinate when you need to. · If you are sexually active, urinate right after you have sex. · Change sanitary pads often.  
· Avoid douches, bubble baths, feminine hygiene sprays, and other feminine hygiene products that have deodorants. · After going to the bathroom, wipe from front to back. When should you call for help? Call your doctor now or seek immediate medical care if: 
  · Symptoms such as fever, chills, nausea, or vomiting get worse or appear for the first time.  
  · You have new pain in your back just below your rib cage. This is called flank pain.  
  · There is new blood or pus in your urine.  
  · You have any problems with your antibiotic medicine. Watch closely for changes in your health, and be sure to contact your doctor if: 
  · You are not getting better after taking an antibiotic for 2 days.  
  · Your symptoms go away but then come back. Where can you learn more? Go to http://www.gray.com/ Enter J476 in the search box to learn more about \"Urinary Tract Infection (UTI) in Women: Care Instructions. \" Current as of: June 29, 2020               Content Version: 12.8 © 2006-2021 [employer]. Care instructions adapted under license by [employer] (which disclaims liability or warranty for this information). If you have questions about a medical condition or this instruction, always ask your healthcare professional. [address] any warranty or liability for your use of this information.

## 2021-04-14 NOTE — PROGRESS NOTES
UTI note    Luisa Leal is a [de-identified] ,  47 y.o. female WHITE who presents today with had concerns including Urinary Pain. Vira Libman Her symptoms started 4 days ago, gradually worsening since that time. Discomfort is in the suprapubic area and does not radiate. Symptoms are not alleviated by hydration. Symptoms are exacerbated with activity. Patient's other complaints: dysuria,urinary urgency, frequency. Her symptoms are moderate. Patient denies back pain and vaginal discharge. There is not any concern of sexual abuse. There is not a history of trauma to the genital area. Patient does not have a history of recurrent UTI. She does not have a history of pyelonephritis. She has a history of  has a past medical history of Abnormal Pap smear of cervix (12/2020), Benign essential HTN (7/10/2019), Dysmenorrhea (8/19/2016), Edema of both legs (7/31/2014), and Hypertension. with the following surgical history  has a past surgical history that includes hx heent. .  . She has not been evaluated for her current complaints. Patient took Josue Watkins prior to appointment, unable to read urine dip. No results found for this or any previous visit. Past Medical History:   Diagnosis Date    Abnormal Pap smear of cervix 12/2020    LGSIL/+HPV    Benign essential HTN 7/10/2019    Dysmenorrhea 8/19/2016    Edema of both legs 7/31/2014    Hypertension      Past Surgical History:   Procedure Laterality Date    HX HEENT      tonsillectomy     Social History     Occupational History    Not on file   Tobacco Use    Smoking status: Never Smoker    Smokeless tobacco: Never Used   Substance and Sexual Activity    Alcohol use:  Yes    Drug use: No    Sexual activity: Yes     Partners: Male     Birth control/protection: Pill     Family History   Problem Relation Age of Onset    Hypertension Mother        Allergies   Allergen Reactions    Norvasc [Amlodipine] Swelling    Pcn [Penicillins] Hives     Prior to Admission medications    Medication Sig Start Date End Date Taking? Authorizing Provider   omeprazole (PRILOSEC) 40 mg capsule Take 1 Cap by mouth daily. 4/1/21   Moy Dugan MD   metoprolol succinate (TOPROL-XL) 200 mg XL tablet TAKE 1 TABLET BY MOUTH EVERY DAY 2/15/21   Moy Dugan MD   lisinopril-hydroCHLOROthiazide (PRINZIDE, ZESTORETIC) 10-12.5 mg per tablet Take 1 Tab by mouth daily. 2/15/21   Moy Dugan MD   norethindrone-e estradiol-iron (Blisovi 24 Fe) 1 mg-20 mcg (24)/75 mg (4) tab Take 1 Tab by mouth daily.  12/10/20   Betzaida Strauss MD        Review of Systems: History obtained from the patient  Constitutional: negative for weight loss, fever, night sweats  Breast: negative for breast lumps, nipple discharge, galactorrhea  GI: negative for change in bowel habits, abdominal pain, black or bloody stools  : see HPI, negative for vaginal discharge  MSK: negative for back pain, joint pain, muscle pain  Skin: negative for itching, rash, hives  Psych: negative for anxiety, depression, change in mood      Objective:  Visit Vitals  /79   Wt 191 lb (86.6 kg)   LMP 03/18/2021   BMI 37.30 kg/m²       Physical Exam:   PHYSICAL EXAMINATION    Constitutional  · Appearance: well-nourished, well developed, alert, in no acute distress    Gastrointestinal  · Abdominal Examination: abdomen non-tender to palpation, normal bowel sounds, no masses present  · Liver and spleen: no hepatomegaly present, spleen not palpable  · Hernias: no hernias identified    Genitourinary  · External Genitalia: normal appearance for age, no discharge present, no tenderness present, no inflammatory lesions present, no masses present, no atrophy present  · Vagina: normal vaginal vault without central or paravaginal defects, no discharge present, no inflammatory lesions present, no masses present  · Bladder: tender to palpation  · Urethra: appears normal  · Cervix: normal   · Uterus: normal size, shape and consistency  · Adnexa: no adnexal tenderness present, no adnexal masses present  · Perineum: perineum within normal limits, no evidence of trauma, no rashes or skin lesions present  · Anus: anus within normal limits, no hemorrhoids present  · Inguinal Lymph Nodes: no lymphadenopathy present    Skin  · General Inspection: no rash, no lesions identified    Neurologic/Psychiatric  · Mental Status:  · Orientation: grossly oriented to person, place and time  · Mood and Affect: mood normal, affect appropriate    Assessment:   dysuria    Plan:   Rx: macrobid x 7d  Urine culture

## 2021-04-17 LAB
BACTERIA SPEC CULT: ABNORMAL
CC UR VC: ABNORMAL
SERVICE CMNT-IMP: ABNORMAL

## 2021-04-21 ENCOUNTER — APPOINTMENT (OUTPATIENT)
Dept: CT IMAGING | Age: 41
DRG: 176 | End: 2021-04-21
Attending: EMERGENCY MEDICINE
Payer: COMMERCIAL

## 2021-04-21 ENCOUNTER — TELEPHONE (OUTPATIENT)
Dept: FAMILY MEDICINE CLINIC | Age: 41
End: 2021-04-21

## 2021-04-21 ENCOUNTER — APPOINTMENT (OUTPATIENT)
Dept: ULTRASOUND IMAGING | Age: 41
DRG: 176 | End: 2021-04-21
Attending: EMERGENCY MEDICINE
Payer: COMMERCIAL

## 2021-04-21 ENCOUNTER — APPOINTMENT (OUTPATIENT)
Dept: GENERAL RADIOLOGY | Age: 41
DRG: 176 | End: 2021-04-21
Attending: EMERGENCY MEDICINE
Payer: COMMERCIAL

## 2021-04-21 ENCOUNTER — HOSPITAL ENCOUNTER (INPATIENT)
Age: 41
LOS: 2 days | Discharge: HOME | DRG: 176 | End: 2021-04-23
Attending: EMERGENCY MEDICINE | Admitting: FAMILY MEDICINE
Payer: COMMERCIAL

## 2021-04-21 DIAGNOSIS — E66.01 OBESITY, MORBID (HCC): ICD-10-CM

## 2021-04-21 DIAGNOSIS — I82.4Y2 ACUTE DEEP VEIN THROMBOSIS (DVT) OF PROXIMAL VEIN OF LEFT LOWER EXTREMITY (HCC): ICD-10-CM

## 2021-04-21 DIAGNOSIS — I10 BENIGN ESSENTIAL HTN: ICD-10-CM

## 2021-04-21 DIAGNOSIS — I26.92 ACUTE SADDLE PULMONARY EMBOLISM, UNSPECIFIED WHETHER ACUTE COR PULMONALE PRESENT (HCC): Primary | ICD-10-CM

## 2021-04-21 PROBLEM — I82.409 DVT (DEEP VENOUS THROMBOSIS) (HCC): Status: ACTIVE | Noted: 2021-04-21

## 2021-04-21 PROBLEM — I26.99 PULMONARY EMBOLUS (HCC): Status: ACTIVE | Noted: 2021-04-21

## 2021-04-21 LAB
ALBUMIN SERPL-MCNC: 3.5 G/DL (ref 3.5–5)
ALBUMIN/GLOB SERPL: 0.8 {RATIO} (ref 1.1–2.2)
ALP SERPL-CCNC: 49 U/L (ref 45–117)
ALT SERPL-CCNC: 21 U/L (ref 12–78)
ANION GAP SERPL CALC-SCNC: 10 MMOL/L (ref 5–15)
AST SERPL-CCNC: 15 U/L (ref 15–37)
BASOPHILS # BLD: 0.1 K/UL (ref 0–0.1)
BASOPHILS NFR BLD: 0 % (ref 0–1)
BILIRUB SERPL-MCNC: 0.7 MG/DL (ref 0.2–1)
BNP SERPL-MCNC: 647 PG/ML
BUN SERPL-MCNC: 7 MG/DL (ref 6–20)
BUN/CREAT SERPL: 8 (ref 12–20)
CALCIUM SERPL-MCNC: 9.6 MG/DL (ref 8.5–10.1)
CHLORIDE SERPL-SCNC: 95 MMOL/L (ref 97–108)
CO2 SERPL-SCNC: 31 MMOL/L (ref 21–32)
COVID-19 RAPID TEST, COVR: NOT DETECTED
CREAT SERPL-MCNC: 0.89 MG/DL (ref 0.55–1.02)
D DIMER PPP FEU-MCNC: 5.46 MG/L FEU (ref 0–0.65)
DIFFERENTIAL METHOD BLD: ABNORMAL
EOSINOPHIL # BLD: 0.1 K/UL (ref 0–0.4)
EOSINOPHIL NFR BLD: 1 % (ref 0–7)
ERYTHROCYTE [DISTWIDTH] IN BLOOD BY AUTOMATED COUNT: 12.6 % (ref 11.5–14.5)
GLOBULIN SER CALC-MCNC: 4.6 G/DL (ref 2–4)
GLUCOSE SERPL-MCNC: 114 MG/DL (ref 65–100)
HCT VFR BLD AUTO: 39.5 % (ref 35–47)
HGB BLD-MCNC: 14.2 G/DL (ref 11.5–16)
IMM GRANULOCYTES # BLD AUTO: 0.1 K/UL (ref 0–0.04)
IMM GRANULOCYTES NFR BLD AUTO: 1 % (ref 0–0.5)
INR PPP: 1 (ref 0.9–1.1)
LYMPHOCYTES # BLD: 2.3 K/UL (ref 0.8–3.5)
LYMPHOCYTES NFR BLD: 19 % (ref 12–49)
MCH RBC QN AUTO: 38.8 PG (ref 26–34)
MCHC RBC AUTO-ENTMCNC: 35.9 G/DL (ref 30–36.5)
MCV RBC AUTO: 107.9 FL (ref 80–99)
MONOCYTES # BLD: 0.5 K/UL (ref 0–1)
MONOCYTES NFR BLD: 4 % (ref 5–13)
NEUTS SEG # BLD: 9.3 K/UL (ref 1.8–8)
NEUTS SEG NFR BLD: 75 % (ref 32–75)
NRBC # BLD: 0 K/UL (ref 0–0.01)
NRBC BLD-RTO: 0 PER 100 WBC
PLATELET # BLD AUTO: 242 K/UL (ref 150–400)
PMV BLD AUTO: 9.4 FL (ref 8.9–12.9)
POTASSIUM SERPL-SCNC: 4.1 MMOL/L (ref 3.5–5.1)
PROT SERPL-MCNC: 8.1 G/DL (ref 6.4–8.2)
PROTHROMBIN TIME: 10.3 SEC (ref 9–11.1)
RBC # BLD AUTO: 3.66 M/UL (ref 3.8–5.2)
SODIUM SERPL-SCNC: 136 MMOL/L (ref 136–145)
SOURCE, COVRS: NORMAL
TROPONIN I SERPL-MCNC: <0.05 NG/ML
TROPONIN I SERPL-MCNC: <0.05 NG/ML
WBC # BLD AUTO: 12.4 K/UL (ref 3.6–11)

## 2021-04-21 PROCEDURE — 85025 COMPLETE CBC W/AUTO DIFF WBC: CPT

## 2021-04-21 PROCEDURE — 83880 ASSAY OF NATRIURETIC PEPTIDE: CPT

## 2021-04-21 PROCEDURE — 84484 ASSAY OF TROPONIN QUANT: CPT

## 2021-04-21 PROCEDURE — 80061 LIPID PANEL: CPT

## 2021-04-21 PROCEDURE — 65660000000 HC RM CCU STEPDOWN

## 2021-04-21 PROCEDURE — 99285 EMERGENCY DEPT VISIT HI MDM: CPT

## 2021-04-21 PROCEDURE — 85610 PROTHROMBIN TIME: CPT

## 2021-04-21 PROCEDURE — 73562 X-RAY EXAM OF KNEE 3: CPT

## 2021-04-21 PROCEDURE — 36415 COLL VENOUS BLD VENIPUNCTURE: CPT

## 2021-04-21 PROCEDURE — 74011000636 HC RX REV CODE- 636: Performed by: EMERGENCY MEDICINE

## 2021-04-21 PROCEDURE — 85379 FIBRIN DEGRADATION QUANT: CPT

## 2021-04-21 PROCEDURE — 71275 CT ANGIOGRAPHY CHEST: CPT

## 2021-04-21 PROCEDURE — 80053 COMPREHEN METABOLIC PANEL: CPT

## 2021-04-21 PROCEDURE — 74011250636 HC RX REV CODE- 250/636: Performed by: EMERGENCY MEDICINE

## 2021-04-21 PROCEDURE — 93971 EXTREMITY STUDY: CPT

## 2021-04-21 PROCEDURE — 87635 SARS-COV-2 COVID-19 AMP PRB: CPT

## 2021-04-21 RX ORDER — PANTOPRAZOLE SODIUM 40 MG/1
40 TABLET, DELAYED RELEASE ORAL DAILY
Status: DISCONTINUED | OUTPATIENT
Start: 2021-04-22 | End: 2021-04-23 | Stop reason: HOSPADM

## 2021-04-21 RX ORDER — ENOXAPARIN SODIUM 100 MG/ML
1 INJECTION SUBCUTANEOUS EVERY 12 HOURS
Status: DISCONTINUED | OUTPATIENT
Start: 2021-04-22 | End: 2021-04-23 | Stop reason: HOSPADM

## 2021-04-21 RX ORDER — SODIUM CHLORIDE 0.9 % (FLUSH) 0.9 %
5-40 SYRINGE (ML) INJECTION EVERY 8 HOURS
Status: DISCONTINUED | OUTPATIENT
Start: 2021-04-21 | End: 2021-04-23 | Stop reason: HOSPADM

## 2021-04-21 RX ORDER — ACETAMINOPHEN 325 MG/1
650 TABLET ORAL
Status: DISCONTINUED | OUTPATIENT
Start: 2021-04-21 | End: 2021-04-23 | Stop reason: HOSPADM

## 2021-04-21 RX ORDER — ENOXAPARIN SODIUM 100 MG/ML
1 INJECTION SUBCUTANEOUS
Status: COMPLETED | OUTPATIENT
Start: 2021-04-21 | End: 2021-04-21

## 2021-04-21 RX ORDER — SODIUM CHLORIDE 0.9 % (FLUSH) 0.9 %
5-40 SYRINGE (ML) INJECTION AS NEEDED
Status: DISCONTINUED | OUTPATIENT
Start: 2021-04-21 | End: 2021-04-23 | Stop reason: HOSPADM

## 2021-04-21 RX ORDER — ACETAMINOPHEN 650 MG/1
650 SUPPOSITORY RECTAL
Status: DISCONTINUED | OUTPATIENT
Start: 2021-04-21 | End: 2021-04-23 | Stop reason: HOSPADM

## 2021-04-21 RX ADMIN — IOPAMIDOL 100 ML: 755 INJECTION, SOLUTION INTRAVENOUS at 16:39

## 2021-04-21 RX ADMIN — Medication 10 ML: at 22:00

## 2021-04-21 RX ADMIN — ENOXAPARIN SODIUM 90 MG: 100 INJECTION SUBCUTANEOUS at 17:27

## 2021-04-21 NOTE — ED TRIAGE NOTES
Pt ambulated to the treatment area with a slight limp. Pt states \"Saturday I stepped off a curb and twisted my left knee by Monday the pain traveled all the way down the left leg and now the andrae horse cramping in in the calf and the whole leg feels numb. I went to Ortho on call Monday they took and xray of my left hip he said I had a muscular injury xray was normal and I was prescribed an anti inflamatory that is not helping the pain. \" Pt left leg is swollen.

## 2021-04-21 NOTE — ED NOTES
Admission and transfer process discussed with pt. Bed assignment received. Pt ambulatory to the restroom with RN assist.  V/S reassessed. Will continue to monitor.

## 2021-04-21 NOTE — ED NOTES
Dr Guillaume Ojeda made aware pt spo2 92%RA with swelling and pain to left leg . Pt denies shortness of breath.

## 2021-04-21 NOTE — TELEPHONE ENCOUNTER
Pt called stating she thinks she has a blood clot in her left leg, noting severe pain from calf, now to entire leg, some areas appear bluish, and pt wants to go to ER or have testing done. Pt sent message in [employer] earlier today, has not received response, has already been seen at [facility] on Monday, but can barely walk now due to pain. Pt will go to ER for evaluation and treatment.  Tanisha

## 2021-04-21 NOTE — ED NOTES
TRANSFER - OUT REPORT:    Telephone Verbal report given to Charu Sotelo RN(name) on Lisa Myers  being transferred to [facility] (unit) for routine progression of care       Report consisted of patients Situation, Background, Assessment and   Recommendations(SBAR). Information from the following report(s) SBAR, ED Summary, Intake/Output, MAR and Recent Results was reviewed with the receiving nurse. Lines:   Peripheral IV 04/21/21 Left Antecubital (Active)   Site Assessment Clean, dry, & intact 04/21/21 1554   Phlebitis Assessment 0 04/21/21 1554   Infiltration Assessment 0 04/21/21 1554   Dressing Status Clean, dry, & intact 04/21/21 1554   Dressing Type Transparent 04/21/21 1554   Hub Color/Line Status Pink 04/21/21 1554        Opportunity for questions and clarification was provided.       Patient transported with:   Monitor  O2 @ 1 liters

## 2021-04-21 NOTE — H&P
[facility] [address] [place] [address]   Office [phone]  Fax [phone]       Admission H&P     Name: Luisa Leal MRN: [id number]  Sex: Female   YOB: 1980  Age: 47 y.o. PCP: Scot Melvin MD     Source of Information: patient, medical records    Chief complaint: Left Leg Pain    History of Present Illness  Luisa Leal is a 47 y.o. female with PMH significant for HTN, Dysmenorrhea on OCP, and Morbid Obesity who presents to the ER complaining of left lower leg pain. Pain started about 3 days before admission, started in the left thigh and knee, moved to the feet. By Monday, there was pain in the entire leg, pain comes mostly while walking, rates it 8/10, aching and numb in nature. Per patient, feels like a charley horse. When mobile, pain is constant. Denies having a similar symtom in the past. Denies hx of dvt or PE, no family history of DVT or PE. She was re-started on OCP about 3 months ago. She was taking off OCP 2-3 years ago due to elevated blood pressure. Denies n/v, dizziness, cp, sob, no loss of sensation in U/L extremity. LMP: 4/17. COVID screening: NEGATIVE. Pt denies sick contacts or recent travel. Denies recent illness, fever, chills, sore throat, vision changes, HA, dizziness, cough , SOB, chest pain, dysuria, n/v/d, abdominal pain or extremity edema. In the ER:   Patient Vitals for the past 8 hrs:   Temp Pulse Resp BP SpO2   04/21/21 2346 98.7 °F (37.1 °C) 96 16 104/76 96 %   04/21/21 2321  93      04/21/21 2028 98.8 °F (37.1 °C) 94 17 107/76 98 %   04/21/21 1936  (!) 102      04/21/21 1836 99.2 °F (37.3 °C)       04/21/21 1745   17 114/72 97 %   04/21/21 1725     92 %   04/21/21 1630    (!) 122/92 94 %        Vitals: as above  Labs: WBC 12.4, Ddimer 5.46, Trop Neg  Imaging:   · XR Knee - No acute process   · CTA Chest - Extensive b/l emboli. Saddle embolus. Right Heart Strain.     Treatment:  Therapeutic Lovenox    EKG: None    Review of Systems  Review of Systems   Constitutional: Negative for appetite change, chills and fever. HENT: Negative for trouble swallowing. Eyes: Negative for photophobia and visual disturbance. Respiratory: Negative for chest tightness and shortness of breath. Cardiovascular: Negative for chest pain, palpitations and leg swelling. Gastrointestinal: Negative for abdominal pain, constipation, diarrhea, nausea and vomiting. Genitourinary: Negative for difficulty urinating and dysuria. Musculoskeletal: Negative for back pain, myalgias and neck stiffness. Skin: Negative for color change. Neurological: Positive for numbness. Negative for dizziness, syncope and facial asymmetry. Psychiatric/Behavioral: Negative for confusion. Home Medications   Prior to Admission medications    Medication Sig Start Date End Date Taking? Authorizing Provider   omeprazole (PRILOSEC) 40 mg capsule Take 1 Cap by mouth daily. 4/1/21  Yes Osiris Buckley MD   metoprolol succinate (TOPROL-XL) 200 mg XL tablet TAKE 1 TABLET BY MOUTH EVERY DAY 2/15/21  Yes Osiris Buckley MD   lisinopril-hydroCHLOROthiazide (PRINZIDE, ZESTORETIC) 10-12.5 mg per tablet Take 1 Tab by mouth daily. 2/15/21  Yes Osiris Buckley MD   norethindrone-e estradiol-iron (Blisovi 24 Fe) 1 mg-20 mcg (24)/75 mg (4) tab Take 1 Tab by mouth daily.  12/10/20  Yes Dulce Clement MD       Allergies  Allergies   Allergen Reactions    Norvasc [Amlodipine] Swelling    Pcn [Penicillins] Hives       Past Medical History  Past Medical History:   Diagnosis Date    Abnormal Pap smear of cervix 12/2020    LGSIL/+HPV    Benign essential HTN 7/10/2019    Dysmenorrhea 8/19/2016    Edema of both legs 7/31/2014    Hypertension        Previous Hospitalization(s)  Past Surgical History:   Procedure Laterality Date    HX HEENT      tonsillectomy       Family History  Family History   Problem Relation Age of Onset    Hypertension Mother Social History  Social History     Socioeconomic History    Marital status: SINGLE     Spouse name: Not on file    Number of children: Not on file    Years of education: Not on file    Highest education level: Not on file   Occupational History    Not on file   Social Needs    Financial resource strain: Not on file    Food insecurity     Worry: Not on file     Inability: Not on file    Transportation needs     Medical: Not on file     Non-medical: Not on file   Tobacco Use    Smoking status: Never Smoker    Smokeless tobacco: Never Used   Substance and Sexual Activity    Alcohol use: Yes    Drug use: No    Sexual activity: Yes     Partners: Male     Birth control/protection: Pill   Lifestyle    Physical activity     Days per week: Not on file     Minutes per session: Not on file    Stress: Not on file   Relationships    Social connections     Talks on phone: Not on file     Gets together: Not on file     Attends Latter day service: Not on file     Active member of club or organization: Not on file     Attends meetings of clubs or organizations: Not on file     Relationship status: Not on file    Intimate partner violence     Fear of current or ex partner: Not on file     Emotionally abused: Not on file     Physically abused: Not on file     Forced sexual activity: Not on file   Other Topics Concern    Not on file   Social History Narrative    Not on file       Patient resides  x  Independently      With family care      Assisted living      SNF      Ambulates  x  Independently      With cane       Assisted walker      Alcohol history     None   x  Social     Chronic     Smoking history  x  None     Former smoker     Current smoker     Drug history  x  None     Former drug user     Current drug user     Code status  x  Full code     DNR/DNI     Partial      Code status discussed with the patient/caregivers.       Vital Signs  Visit Vitals  /76 (BP 1 Location: Right upper arm, BP Patient Position: At rest)   Pulse 96   Temp 98.7 °F (37.1 °C)   Resp 16   Ht 5' (1.524 m)   Wt 193 lb 6.4 oz (87.7 kg)   SpO2 96%   Breastfeeding No   BMI 37.77 kg/m²       Physical Exam  Physical Exam  Constitutional:       Appearance: Normal appearance. HENT:      Head: Normocephalic and atraumatic. Eyes:      General: No scleral icterus. Neck:      Musculoskeletal: Normal range of motion. Cardiovascular:      Rate and Rhythm: Regular rhythm. Tachycardia present. Pulmonary:      Effort: Pulmonary effort is normal. No respiratory distress. Abdominal:      General: Bowel sounds are normal.      Palpations: Abdomen is soft. Tenderness: There is no guarding or rebound. Musculoskeletal: Normal range of motion. General: Tenderness present. Skin:     General: Skin is warm. Neurological:      General: No focal deficit present. Mental Status: She is alert. Psychiatric:         Mood and Affect: Mood normal.             Laboratory Data  Recent Results (from the past 8 hour(s))   COVID-19 RAPID TEST    Collection Time: 04/21/21  9:03 PM   Result Value Ref Range    Specimen source Nasopharyngeal      COVID-19 rapid test Not detected NOTD     NT-PRO BNP    Collection Time: 04/21/21  9:55 PM   Result Value Ref Range    NT pro- (H) <125 PG/ML   TROPONIN I    Collection Time: 04/21/21  9:55 PM   Result Value Ref Range    Troponin-I, Qt. <0.05 <0.05 ng/mL       Imaging  CXR Results  (Last 48 hours)    None        CT Results  (Last 48 hours)               04/21/21 1645  CTA CHEST W OR W WO CONT Final result    Impression:  Extensive bilateral pulmonary emboli. Saddle embolus. Right heart strain       Geographic low density within the left hepatic lobe. May represent focal fatty   infiltration. Infarct is felt to be less likely. This could be evaluated with   nonemergent MR imaging       The findings were called to Dr. Van Betancur on 4/21/2021 at [phone] by myself.   [id number]        Narrative:  INDICATION: Acute DVT. Evaluate for PE       COMPARISON: None       TECHNIQUE:  2.5 mm axial images were obtained from the bases to the lung apices   after the intravenous administration of 100 cc of Isovue-370. Three-dimensional   postprocessing was performed by the technologist with MIP reconstructions. CT   dose reduction was achieved through use of a standardized protocol tailored for   this examination and automatic exposure control for dose modulation. FINDINGS:       THYROID: No nodule. MEDIASTINUM: No mass or lymphadenopathy. GEOFFREY: No mass or lymphadenopathy. THORACIC AORTA: No dissection or aneurysm. MAIN PULMONARY ARTERY: Extensive bilateral pulmonary emboli. Saddle embolus   TRACHEA/BRONCHI: Patent. ESOPHAGUS: No wall thickening or dilatation. HEART: Not enlarged however the interventricular septum is deviated towards the   left ventricle suggesting heart strain   PLEURA: No effusion or pneumothorax. LUNGS: No nodule, mass, or airspace disease. INCIDENTALLY IMAGED UPPER ABDOMEN: Geographic low density left hepatic lobe   BONES: No destructive bone lesion. Assessment and Plan     Armando Hernandez is a 47 y.o. female with PMH of HTN, Dysmenorrhea on OCP, and Morbid Obesity who is admitted for PE/DVT. PE: Pt is HDS. Required 1 L of oxygen after saturation went down to 90-92 % on RA. D-dimer 5.46. CTA w/ extensive b/l emboli, saddle embolus, and R Heart Strain. Trop neg x 2. PESI score Class 1 (low risk). Discussed with IR (Dr. Steve Thomas) who reviewed the images, she has large clot burden but only mild heart strain. Agrees with Echo in the morning and continuation of Therapeutic Lovenox. Risk factor includes OCP use, and Obesity.   - Admit to telemetry  - Vitals per unit protocol.   - CBC and CMP daily   - Therapeutic Lovenox  - Monitor O2, O2 prn  - Follow up on Lipid Panel  - Follow up on EKG and  ECHO   - DC OCP outpt    DVT: LE Doppler w/ L DVT.  Patient's risk factor VTE are obesity, and OCP use. No hx of cancer or hx of thrombophilia or immobility.   - Treatment per above    Hypertension: POA BP was 122/90. Holding BP medication due to R heart strain seen on imaging, although noted to be a mild heart strain by IR (Dr. Lynda Sood). In case patient becomes HD unstable due to the heart strain, do not want to decrease blood pressure further.   - Hold home Prinzide 10-12.5 mg daily   - Hold home Metoprolol succinate daily   - Will continue to monitor at this time and readjust as BP's trend. GERD: Home omeprazole 40 mg daily.  - Protonix 40 mg daily    Obesity  - PT with BMI of Body mass index is 37.77 kg/m². - Encouraging lifestyle modifications and further follow up outpatient. FEN/GI - Cardiac diet. Activity - Ambulate as tolerated  DVT prophylaxis - Lovenox  GI prophylaxis - Protonix  Fall prophylaxis - Not indicated at this time. Disposition - Admit to Telemetry. Plan to d/c to Home. Code Status - Full, discussed with patient / caregivers. Next of Kin Name and Contact : Christy Lau: [phone]      Patient Marion Guadalupe will be discussed with Dr. Kasandra Thornton.     5:32 PM, 04/22/21  Eduardo Horn MD  Family Medicine Resident       For Billing    Chief Complaint   Patient presents with   Pelon Hews Leg Pain     left       Hospital Problems  Date Reviewed: 4/14/2021          Codes Class Noted POA    [facility] ICD-10-CM: I26.99  ICD-9-CM: 415.19  4/21/2021 Unknown        DVT (deep venous thrombosis) ([facility] ICD-10-CM: I82.409  ICD-9-CM: 453.40  4/21/2021 Unknown

## 2021-04-21 NOTE — ED PROVIDER NOTES
51-year-old female history of hypertension presents to the emergency department today after she injured her left lower extremity on Saturday. She feels like she may have twisted her knee. She has had worsening pain since that time along her posterior thigh as well as calf. She was seen [facility] with an unremarkable hip x-ray. She denies any chest pain or shortness of breath. She has no history of DVT or PE. The history is provided by the patient and medical records. Leg Pain   This is a new problem. The current episode started more than 2 days ago. The problem occurs constantly. The problem has not changed since onset. The pain is present in the left knee. The pain is moderate. Pertinent negatives include no numbness, full range of motion, no stiffness, no tingling, no itching, no back pain and no neck pain. There has been a history of trauma. Past Medical History:   Diagnosis Date    Abnormal Pap smear of cervix 12/2020    LGSIL/+HPV    Benign essential HTN 7/10/2019    Dysmenorrhea 8/19/2016    Edema of both legs 7/31/2014    Hypertension        Past Surgical History:   Procedure Laterality Date    HX HEENT      tonsillectomy         Family History:   Problem Relation Age of Onset    Hypertension Mother        Social History     Socioeconomic History    Marital status: SINGLE     Spouse name: Not on file    Number of children: Not on file    Years of education: Not on file    Highest education level: Not on file   Occupational History    Not on file   Social Needs    Financial resource strain: Not on file    Food insecurity     Worry: Not on file     Inability: Not on file    Transportation needs     Medical: Not on file     Non-medical: Not on file   Tobacco Use    Smoking status: Never Smoker    Smokeless tobacco: Never Used   Substance and Sexual Activity    Alcohol use:  Yes    Drug use: No    Sexual activity: Yes     Partners: Male     Birth control/protection: Pill Lifestyle    Physical activity     Days per week: Not on file     Minutes per session: Not on file    Stress: Not on file   Relationships    Social connections     Talks on phone: Not on file     Gets together: Not on file     Attends [employer] service: Not on file     Active member of club or organization: Not on file     Attends meetings of clubs or organizations: Not on file     Relationship status: Not on file    Intimate partner violence     Fear of current or ex partner: Not on file     Emotionally abused: Not on file     Physically abused: Not on file     Forced sexual activity: Not on file   Other Topics Concern    Not on file   Social History Narrative    Not on file         ALLERGIES: Norvasc [amlodipine] and Pcn [penicillins]    Review of Systems   Constitutional: Negative for fatigue and fever. HENT: Negative for sneezing and sore throat. Respiratory: Negative for cough and shortness of breath. Cardiovascular: Positive for leg swelling. Negative for chest pain. Gastrointestinal: Negative for abdominal pain, diarrhea, nausea and vomiting. Genitourinary: Negative for difficulty urinating and dysuria. Musculoskeletal: Positive for arthralgias. Negative for back pain, myalgias, neck pain and stiffness. Skin: Negative for color change, itching and rash. Neurological: Negative for tingling, weakness, numbness and headaches. Psychiatric/Behavioral: Negative for agitation and behavioral problems. Vitals:    04/21/21 1506   BP: (!) 122/90   Pulse: (!) 115   Resp: 20   Temp: 100.3 °F (37.9 °C)   SpO2: 93%   Weight: 86.8 kg (191 lb 5.8 oz)   Height: 5' (1.524 m)            Physical Exam  Vitals signs and nursing note reviewed. Constitutional:       General: She is not in acute distress. Appearance: Normal appearance. She is well-developed. She is not ill-appearing, toxic-appearing or diaphoretic. HENT:      Head: Normocephalic and atraumatic.       Nose: Nose normal. Mouth/Throat:      Mouth: Mucous membranes are moist.      Pharynx: Oropharynx is clear. Eyes:      Extraocular Movements: Extraocular movements intact. Conjunctiva/sclera: Conjunctivae normal.      Pupils: Pupils are equal, round, and reactive to light. Neck:      Musculoskeletal: Normal range of motion and neck supple. No neck rigidity or muscular tenderness. Cardiovascular:      Rate and Rhythm: Normal rate and regular rhythm. Pulses: Normal pulses. Heart sounds: Normal heart sounds. Pulmonary:      Effort: Pulmonary effort is normal. No respiratory distress. Breath sounds: Normal breath sounds. No wheezing. Chest:      Chest wall: No tenderness. Abdominal:      General: Abdomen is flat. There is no distension. Palpations: Abdomen is soft. Tenderness: There is no abdominal tenderness. There is no guarding or rebound. Musculoskeletal: Normal range of motion. General: Tenderness present. No swelling, deformity or signs of injury. Right lower leg: No edema. Left lower leg: Edema present. Comments: Mild effusion about the left knee. The joint is stable with some tenderness with range of motion. Mild edema in the calf with tenderness. No focal erythema or swelling. Skin:     General: Skin is warm and dry. Capillary Refill: Capillary refill takes less than 2 seconds. Neurological:      General: No focal deficit present. Mental Status: She is alert and oriented to person, place, and time. Psychiatric:         Mood and Affect: Mood normal.         Behavior: Behavior normal.          MDM  Number of Diagnoses or Management Options  Diagnosis management comments: 31-year-old female presents as above with leg pain with evidence of DVT as well as saddle pulmonary embolism without hypoxia. Plan for admission to the hospital for further management.        Amount and/or Complexity of Data Reviewed  Clinical lab tests: reviewed  Tests in the radiology section of CPT®: reviewed           Procedures      Perfect Serve Consult for Admission  4:52 PM    ED Room Number: PAJ40/96  Patient Name and age:  Luisa Leal 47 y.o.  female  Working Diagnosis:   1. Acute saddle pulmonary embolism, unspecified whether acute cor pulmonale present ([address]    2. Acute deep vein thrombosis (DVT) of proximal vein of left lower extremity (HCC)        COVID-19 Suspicion:  no  Sepsis present:  no  Reassessment needed: N/A  Code Status:  Full Code  Readmission: no  Isolation Requirements:  no  Recommended Level of Care:  telemetry  Department:[facility] - [phone]  Other: Patient with recent left leg injury.   Diffuse DVT as well as saddle PE.

## 2021-04-21 NOTE — ED NOTES
TRANSFER - OUT REPORT:    Verbal report given to [facility] Supervisor(name) on Logan Bishop  being transferred to [facility] (unit) for routine progression of care       Report consisted of patients Situation, Background, Assessment and   Recommendations(SBAR). Information from the following report(s) SBAR, ED Summary, Intake/Output, MAR and Recent Results was reviewed with the receiving nurse. Lines:   Peripheral IV 04/21/21 Left Antecubital (Active)   Site Assessment Clean, dry, & intact 04/21/21 1554   Phlebitis Assessment 0 04/21/21 1554   Infiltration Assessment 0 04/21/21 1554   Dressing Status Clean, dry, & intact 04/21/21 1554   Dressing Type Transparent 04/21/21 1554   Hub Color/Line Status Pink 04/21/21 1554        Opportunity for questions and clarification was provided.       Patient transported with:   Monitor  O2 @ 1 liters

## 2021-04-21 NOTE — PROGRESS NOTES
[facility]   Senior Resident Admission Note    CC: left leg pain    HPI:  Jenny Pritchett is a 47 y.o. female with PMHx of HTN, obesity, OCP use, who presents to the ER complaining of left calf and knee pain after stepping off a curb last saturday. Seen by Ortho on call and had an xray, was told to take ibuprofen. Had been off [facility] for awhile and restarted OCP 3 weeks ago. No hx or famhx of bloodclots. No URI symptoms, n/v/d, or COVID contacts. She has not gotten the COVID vaccine. In the ED:   Vitals:   Patient Vitals for the past 4 hrs:   Temp Resp BP SpO2   04/21/21 1836 99.2 °F (37.3 °C)      04/21/21 1745  17 114/72 97 %   04/21/21 1725    92 %   04/21/21 1630   (!) 122/92 94 %   04/21/21 1600  20 110/72 90 %   04/21/21 1557   109/70 92 %   04/21/21 1530  17 116/83 91 %         Labs: D-dimer 5.46, trop neg, WBC 12.4  EKG: none  Imaging: CTA chest showed extensive B/L pulmonary emboli, saddle embolus, and signs of right heart strain, LE duplex showed thrombus in left femoral and popliteal veins  Pt received: Therapeutic Lovenox      Chart reviewed. Patient seen, examined, and discussed with Dr. Mackenzie Kaur (PGY-1). See Resident H&P note for more details. Pertinent PE Findings:   Physical Exam  Vitals signs and nursing note reviewed. Constitutional:       General: She is not in acute distress. Appearance: Normal appearance. She is not ill-appearing. Cardiovascular:      Rate and Rhythm: Normal rate and regular rhythm. Heart sounds: No murmur. Pulmonary:      Effort: Pulmonary effort is normal.      Breath sounds: Normal breath sounds. Neurological:      General: No focal deficit present. Mental Status: She is alert and oriented to person, place, and time. Psychiatric:         Mood and Affect: Mood normal.         Behavior: Behavior normal.          A/P: Symptoms likely d/t saddle PE.     Saddle Embolism: with right heart strain seen on CTA.  PESI score Class I very low risk of 30 day mortality. Spoke to IR on call Dr. Doroteo Brice who reviewed the images, she has large clot burden but only mild heart strain--he agrees with Echo in the morning and continue Therapeutic Lovenox  -Trend trops, add proBNP, EKG  -Echo  -Rapid Covid  -Continue Therapeutic Lovenox, next dose 5AM    I agree with remaining assessment and plan as documented in Dr. Teena Macdonald note.       Pt discussed with Dr. Gracia Roche (on-call attending physician)    Haynes Cooks, DO  Family Medicine Resident PGY-2

## 2021-04-21 NOTE — PROGRESS NOTES
1810 Attempt to call report, Grace Arias RN not available. 1823 TRANSFER - IN REPORT:    Verbal report received from [address] (name) on Minoanba Bishop  being received from ED (unit) for routine progression of care      Report consisted of patients Situation, Background, Assessment and   Recommendations(SBAR). Information from the following report(s) SBAR and Kardex was reviewed with the receiving nurse. Opportunity for questions and clarification was provided. Assessment completed upon patients arrival to unit and care assumed. 1915 Bedside and Verbal shift change report given to Mercedes Lee RN (oncoming nurse) by Charu Lockwood RN (offgoing nurse). Report included the following information SBAR and Kardex.

## 2021-04-22 ENCOUNTER — APPOINTMENT (OUTPATIENT)
Dept: NON INVASIVE DIAGNOSTICS | Age: 41
DRG: 176 | End: 2021-04-22
Attending: STUDENT IN AN ORGANIZED HEALTH CARE EDUCATION/TRAINING PROGRAM
Payer: COMMERCIAL

## 2021-04-22 LAB
ANION GAP SERPL CALC-SCNC: 6 MMOL/L (ref 5–15)
BASOPHILS # BLD: 0 K/UL (ref 0–0.1)
BASOPHILS NFR BLD: 0 % (ref 0–1)
BUN SERPL-MCNC: 6 MG/DL (ref 6–20)
BUN/CREAT SERPL: 10 (ref 12–20)
CALCIUM SERPL-MCNC: 9.4 MG/DL (ref 8.5–10.1)
CHLORIDE SERPL-SCNC: 101 MMOL/L (ref 97–108)
CHOLEST SERPL-MCNC: 195 MG/DL
CO2 SERPL-SCNC: 29 MMOL/L (ref 21–32)
CREAT SERPL-MCNC: 0.63 MG/DL (ref 0.55–1.02)
DIFFERENTIAL METHOD BLD: ABNORMAL
ECHO AO ASC DIAM: 3.41 CM
ECHO AO ROOT DIAM: 3.27 CM
ECHO AV AREA PEAK VELOCITY: 2.44 CM2
ECHO AV AREA VTI: 2.23 CM2
ECHO AV AREA/BSA PEAK VELOCITY: 1.3 CM2/M2
ECHO AV AREA/BSA VTI: 1.2 CM2/M2
ECHO AV MEAN GRADIENT: 2.04 MMHG
ECHO AV PEAK GRADIENT: 3.71 MMHG
ECHO AV PEAK VELOCITY: 96.27 CM/S
ECHO AV VTI: 15.25 CM
ECHO IVC PROX: 1.66 CM
ECHO LA AREA 4C: 13.45 CM2
ECHO LA MAJOR AXIS: 2.8 CM
ECHO LA MINOR AXIS: 1.52 CM
ECHO LA VOL 4C: 26.83 ML (ref 22–52)
ECHO LA VOLUME INDEX A4C: 14.59 ML/M2 (ref 16–28)
ECHO LV E' LATERAL VELOCITY: 3.32 CENTIMETER/SECOND
ECHO LV E' SEPTAL VELOCITY: 4.55 CENTIMETER/SECOND
ECHO LV INTERNAL DIMENSION DIASTOLIC: 3.83 CM (ref 3.9–5.3)
ECHO LV INTERNAL DIMENSION SYSTOLIC: 2.69 CM
ECHO LV IVSD: 1.16 CM (ref 0.6–0.9)
ECHO LV MASS 2D: 146.4 G (ref 67–162)
ECHO LV MASS INDEX 2D: 79.7 G/M2 (ref 43–95)
ECHO LV POSTERIOR WALL DIASTOLIC: 1.15 CM (ref 0.6–0.9)
ECHO LVOT DIAM: 2 CM
ECHO LVOT PEAK GRADIENT: 2.24 MMHG
ECHO LVOT PEAK VELOCITY: 74.88 CM/S
ECHO LVOT SV: 34 ML
ECHO LVOT VTI: 10.82 CM
ECHO MV A VELOCITY: 58.47 CENTIMETER/SECOND
ECHO MV AREA PHT: 9.74 CM2
ECHO MV E DECELERATION TIME (DT): 77.9 MS
ECHO MV E VELOCITY: 47.5 CENTIMETER/SECOND
ECHO MV PRESSURE HALF TIME (PHT): 22.59 MS
ECHO PV MAX VELOCITY: 80.7 CM/S
ECHO PV PEAK INSTANTANEOUS GRADIENT SYSTOLIC: 2.64 MMHG
ECHO RV INTERNAL DIMENSION: 3.87 CM
ECHO RV TAPSE: 1.89 CM (ref 1.5–2)
ECHO TV REGURGITANT MAX VELOCITY: 253.06 CM/S
ECHO TV REGURGITANT PEAK GRADIENT: 25.62 MMHG
EOSINOPHIL # BLD: 0.2 K/UL (ref 0–0.4)
EOSINOPHIL NFR BLD: 2 % (ref 0–7)
ERYTHROCYTE [DISTWIDTH] IN BLOOD BY AUTOMATED COUNT: 12.9 % (ref 11.5–14.5)
GLUCOSE SERPL-MCNC: 90 MG/DL (ref 65–100)
HCT VFR BLD AUTO: 38.7 % (ref 35–47)
HDLC SERPL-MCNC: 38 MG/DL
HDLC SERPL: 5.1 {RATIO} (ref 0–5)
HGB BLD-MCNC: 13.5 G/DL (ref 11.5–16)
IMM GRANULOCYTES # BLD AUTO: 0.1 K/UL (ref 0–0.04)
IMM GRANULOCYTES NFR BLD AUTO: 1 % (ref 0–0.5)
LDLC SERPL CALC-MCNC: 117.4 MG/DL (ref 0–100)
LIPID PROFILE,FLP: ABNORMAL
LVOT MG: 1.16 MMHG
LYMPHOCYTES # BLD: 2.9 K/UL (ref 0.8–3.5)
LYMPHOCYTES NFR BLD: 30 % (ref 12–49)
MAGNESIUM SERPL-MCNC: 2.5 MG/DL (ref 1.6–2.4)
MCH RBC QN AUTO: 38.5 PG (ref 26–34)
MCHC RBC AUTO-ENTMCNC: 34.9 G/DL (ref 30–36.5)
MCV RBC AUTO: 110.3 FL (ref 80–99)
MONOCYTES # BLD: 0.5 K/UL (ref 0–1)
MONOCYTES NFR BLD: 5 % (ref 5–13)
NEUTS SEG # BLD: 5.8 K/UL (ref 1.8–8)
NEUTS SEG NFR BLD: 62 % (ref 32–75)
NRBC # BLD: 0 K/UL (ref 0–0.01)
NRBC BLD-RTO: 0 PER 100 WBC
PLATELET # BLD AUTO: 242 K/UL (ref 150–400)
PMV BLD AUTO: 9.2 FL (ref 8.9–12.9)
POTASSIUM SERPL-SCNC: 3.1 MMOL/L (ref 3.5–5.1)
RBC # BLD AUTO: 3.51 M/UL (ref 3.8–5.2)
RBC MORPH BLD: ABNORMAL
SODIUM SERPL-SCNC: 136 MMOL/L (ref 136–145)
TRIGL SERPL-MCNC: 198 MG/DL (ref ?–150)
TROPONIN I SERPL-MCNC: <0.05 NG/ML
VLDLC SERPL CALC-MCNC: 39.6 MG/DL
WBC # BLD AUTO: 9.5 K/UL (ref 3.6–11)

## 2021-04-22 PROCEDURE — 74011250637 HC RX REV CODE- 250/637: Performed by: STUDENT IN AN ORGANIZED HEALTH CARE EDUCATION/TRAINING PROGRAM

## 2021-04-22 PROCEDURE — 94618 PULMONARY STRESS TESTING: CPT

## 2021-04-22 PROCEDURE — 85025 COMPLETE CBC W/AUTO DIFF WBC: CPT

## 2021-04-22 PROCEDURE — 80048 BASIC METABOLIC PNL TOTAL CA: CPT

## 2021-04-22 PROCEDURE — 93306 TTE W/DOPPLER COMPLETE: CPT

## 2021-04-22 PROCEDURE — 94761 N-INVAS EAR/PLS OXIMETRY MLT: CPT

## 2021-04-22 PROCEDURE — 83735 ASSAY OF MAGNESIUM: CPT

## 2021-04-22 PROCEDURE — 65660000000 HC RM CCU STEPDOWN

## 2021-04-22 PROCEDURE — 36415 COLL VENOUS BLD VENIPUNCTURE: CPT

## 2021-04-22 PROCEDURE — 84484 ASSAY OF TROPONIN QUANT: CPT

## 2021-04-22 PROCEDURE — 74011250636 HC RX REV CODE- 250/636: Performed by: STUDENT IN AN ORGANIZED HEALTH CARE EDUCATION/TRAINING PROGRAM

## 2021-04-22 PROCEDURE — 99222 1ST HOSP IP/OBS MODERATE 55: CPT | Performed by: FAMILY MEDICINE

## 2021-04-22 RX ORDER — APIXABAN 5 MG (74)
KIT ORAL
Qty: 1 DOSE PACK | Refills: 0 | Status: SHIPPED | OUTPATIENT
Start: 2021-04-22 | End: 2021-04-22

## 2021-04-22 RX ORDER — POTASSIUM CHLORIDE 750 MG/1
40 TABLET, FILM COATED, EXTENDED RELEASE ORAL
Status: COMPLETED | OUTPATIENT
Start: 2021-04-22 | End: 2021-04-22

## 2021-04-22 RX ORDER — METOPROLOL SUCCINATE 50 MG/1
200 TABLET, EXTENDED RELEASE ORAL DAILY
Status: DISCONTINUED | OUTPATIENT
Start: 2021-04-22 | End: 2021-04-23 | Stop reason: HOSPADM

## 2021-04-22 RX ADMIN — PANTOPRAZOLE SODIUM 40 MG: 40 TABLET, DELAYED RELEASE ORAL at 09:30

## 2021-04-22 RX ADMIN — Medication 10 ML: at 04:36

## 2021-04-22 RX ADMIN — POTASSIUM BICARBONATE 20 MEQ: 782 TABLET, EFFERVESCENT ORAL at 09:30

## 2021-04-22 RX ADMIN — POTASSIUM CHLORIDE 40 MEQ: 750 TABLET, FILM COATED, EXTENDED RELEASE ORAL at 07:16

## 2021-04-22 RX ADMIN — Medication 10 ML: at 22:23

## 2021-04-22 RX ADMIN — Medication 10 ML: at 14:15

## 2021-04-22 RX ADMIN — METOPROLOL SUCCINATE 200 MG: 50 TABLET, EXTENDED RELEASE ORAL at 15:40

## 2021-04-22 RX ADMIN — ENOXAPARIN SODIUM 90 MG: 100 INJECTION SUBCUTANEOUS at 16:42

## 2021-04-22 RX ADMIN — ENOXAPARIN SODIUM 90 MG: 100 INJECTION SUBCUTANEOUS at 04:35

## 2021-04-22 NOTE — PROGRESS NOTES
Physician Progress Note      Loreto Meyer  CSN #:                  [id number]  :                       1980  ADMIT DATE:       2021 2:52 PM  [address] [place] DATE:  RESPONDING  PROVIDER #:        Rosie Curran MD          QUERY TEXT:    Dear Hospitalist Team,  Pt admitted with LLE pain/swelling and has pulmonary embolism documented within the H&P and subsequent progress notes. If possible, please document in progress notes and discharge summary if you are evaluating and/or treating any of the following: The medical record reflects the following:    Risk Factors: 47 Yr F admitted with PE/DVT    Clinical Indicators: Patient arrived to the ED with c/o left lower extremity pain/swelling with numbness. Work up in the ED revealed patient positive for extensive DVT. CTA Chest was performed which revealed Extensive bilateral pulmonary emboli. Saddle embolus. Right heart strain. Patient has a BNP of 647. D-Dimer 5.46. Troponin negative. Progress note by Dr. Akbar Oneil on  states, Saddle Embolism: with right heart strain seen on CTA. COVID negative. Patient is receiving Lovenox 90 mg SC Q 12 hrs. Treatment: Coagulation labs, BNP, CTA Chest, frequent monitoring/vital signs and Lovenox 90 mg SC Q 12 hrs. Thank you,  Luis Alberto Dasilva RN, [place], [address] [place] [address]  Options provided:  -- Acute Saddle pulmonary embolism POA  -- Acute Saddle pulmonary embolism with Cor Pulmonale POA  -- Chronic Saddle pulmonary embolism POA  -- Other - I will add my own diagnosis  -- Disagree - Not applicable / Not valid  -- Disagree - Clinically unable to determine / Unknown  -- Refer to Clinical Documentation Reviewer    PROVIDER RESPONSE TEXT:    This patient has acute saddle pulmonary embolism POA. Query created by:  Leeanne Linares on 2021 11:36 AM      QUERY TEXT:    Dear [facility] Team,  Pt admitted with LLE swelling, pain and numbness and has DVT documented within the H&P and subsequent progress notes. If possible, please document in progress notes and discharge summary further specificity regarding the DVT to include POA status, laterality, acuity/chronicity and vessels affected/involved: The medical record reflects the following:    Risk Factors: 47 yr F admitted with PE/DVT    Clinical Indicators: Patient arrives to the ED with c/o left lower extremity pain, swelling and numbness. Work up in the ED revealed a D-Dimer 5.46. Duplex of BLE was completed which revealed Acute thrombus present in the left common femoral vein. Acute thrombus present in the left proximal femoral vein. Acute thrombus present in the left mid femoral vein. Acute thrombus present in the left distal femoral vein. Acute thrombus present in the left popliteal vein. Patient was started on Lovenox 90 MG SC Q 12 hrs. Treatment: Coagulation labs, Duplex lower extremities, frequent monitoring/vital signs and Lovenox 90 MG SC Q 12 hrs. Thank you,  Elver Knapp RN, [place], [address] [place] [address]  Options provided:  -- Acute left common femoral, proximal femoral, mid femoral, distal femoral and popliteal DVT present on admission  -- Chronic left common femoral, proximal femoral, mid femoral, distal femoral and popliteal DVT present on admission  -- Other - I will add my own diagnosis  -- Disagree - Not applicable / Not valid  -- Disagree - Clinically unable to determine / Unknown  -- Refer to Clinical Documentation Reviewer    PROVIDER RESPONSE TEXT:    Acute left common femoral, proximal femoral, mid femoral, distal femoral and popliteal DVT was present on admission. Query created by:  Tahir Gandara on 4/22/2021 11:43 AM      Electronically signed by:  Ronny Avilez MD 4/22/2021 6:50 PM

## 2021-04-22 NOTE — PROGRESS NOTES
4/22/2021  9:54 AM    Reason for Admission:  Shortness of breath; bilateral pulmonary emboli  PMH: HTN, morbid obesity, dysmenorrhea on OCP  Patient iADLs and drives at baseline. She lives alone with friends and family nearby who can assist. Her mother will transport at discharge. Her home has 2 steps/ramp to enter and no steps inside. Patient does not own any DME and has never received home health. Preferred Rx: [facility] - @ [employer]    RUR Score:   7%, low                  Plan for utilizing home health:    Not anticipated      PCP: First and Last name:  Apolinar Beebe MD     Name of Practice:    Are you a current patient: Yes/No: Yes   Approximate date of last visit: 4/14/21   Can you participate in a virtual visit with your PCP: Yes                    Current Advanced Directive/Advance Care Plan: Full Code. ACP note completed. Healthcare Decision Maker:       Primary Decision Maker: Tico Back - Mother - [phone]                  Transition of Care Plan:          1. Plan for ECHO and EKG today; monitoring medical progression  2. Anticipate home with family assistance  3. Mother to transport at discharge  4. CM to follow for needs    Care Management Interventions  PCP Verified by CM: Yes(Joon)  Palliative Care Criteria Met (RRAT>21 & CHF Dx)?: No  Mode of Transport at Discharge:  Other (see comment)(Susi cifuentes)  Transition of Care Consult (CM Consult): Discharge Planning  MyChart Signup: No  Discharge Durable Medical Equipment: No  Physical Therapy Consult: No  Occupational Therapy Consult: No  Speech Therapy Consult: No  Current Support Network: Lives Alone  Confirm Follow Up Transport: Family  The Plan for Transition of Care is Related to the Following Treatment Goals : bilateral pulmonary emboli and L DVT  Discharge Location  Discharge Placement: Home with family assistance    Debra Goldsmith RN

## 2021-04-22 NOTE — PROGRESS NOTES
Bedside and Verbal shift change report given to [address] [place] [address] (oncoming nurse) by Tova Dye (offgoing nurse). Report included the following information SBAR, Kardex, ED Summary, Procedure Summary, Intake/Output, MAR, Recent Results, Med Rec Status, Alarm Parameters  and Quality Measures.

## 2021-04-22 NOTE — PROGRESS NOTES
Problem: Falls - Risk of  Goal: *Absence of Falls  Description: Document Lear Shaker Fall Risk and appropriate interventions in the flowsheet.   Outcome: Progressing Towards Goal  Note: Fall Risk Interventions:            Medication Interventions: Teach patient to arise slowly, Patient to call before getting OOB, Evaluate medications/consider consulting pharmacy                   Problem: Patient Education: Go to Patient Education Activity  Goal: Patient/Family Education  Outcome: Progressing Towards Goal     Problem: Cardiac Output -  Decreased  Goal: *Vital signs within specified parameters  Outcome: Progressing Towards Goal  Goal: *Optimal cardiac output  Outcome: Progressing Towards Goal  Goal: *Absence of hypoxia  Outcome: Progressing Towards Goal  Goal: *Absence of peripheral edema  Outcome: Progressing Towards Goal  Goal: *Intravascular fluid volume and electrolyte balance  Outcome: Progressing Towards Goal     Problem: Patient Education: Go to Patient Education Activity  Goal: Patient/Family Education  Outcome: Progressing Towards Goal

## 2021-04-22 NOTE — PROGRESS NOTES
Problem: Falls - Risk of  Goal: *Absence of Falls  Description: Document Hernesto Khan Fall Risk and appropriate interventions in the flowsheet.   Outcome: Progressing Towards Goal  Note: Fall Risk Interventions:            Medication Interventions: Patient to call before getting OOB, Teach patient to arise slowly                   Problem: Patient Education: Go to Patient Education Activity  Goal: Patient/Family Education  Outcome: Progressing Towards Goal     Problem: Cardiac Output -  Decreased  Goal: *Vital signs within specified parameters  Outcome: Progressing Towards Goal  Goal: *Absence of hypoxia  Outcome: Progressing Towards Goal  Goal: *Intravascular fluid volume and electrolyte balance  Outcome: Progressing Towards Goal     Problem: Patient Education: Go to Patient Education Activity  Goal: Patient/Family Education  Outcome: Progressing Towards Goal

## 2021-04-22 NOTE — PROGRESS NOTES
0749: notified by telemetry that patient heart rate reached 140, sustaining 130's. Called family practice, no answer, will attempt to call back to let them know. Patient resting comfortably in bed, talking on phone. Will continue to monitor patient    [phone]: notified family practice via telephone of HR. Will continue to monitor patient.  Orders to follow

## 2021-04-22 NOTE — ADT AUTH CERT NOTES
[address]  
   
FACILITY NPI : [id number] FACILITY TAX ID :  
  
ST. [address] 
SFM 3 PRO CARE TELE 2 
[facility] [facility] [place] [facility] [address] [phone] 
  
 
 DEPT CONTACT: 
Luis Demi 
PS#[phone] IXQ#[phone] 
  
   
Patient Name :Armando Hernandez  : 1980 (40 yrs) MRN : [id number] 
  
Patient Mailing Address [facility];* [address] [facility] [58] , [address]          
  
  . 
  
   
Insurance Plan Payor: BLUE CROSS / Plan: [address] [place] [address] / Product Type: PPO /  
  
Primary Coverage Subscriber ID : [id number] 
  
Secondary Coverage:  N/A 
  
Secondary Subscriber ID :   
   
Current Patient Class : INPATIENT Admit Date : 2021 
  
REQUESTED LEVEL OF CARE: INPATIENT [101] Diagnosis : Pulmonary embolus (HCC);DVT (deep venous thrombosis) ([facility] 
                    
  
ICD10 Code : Pulmonary embolus ([address] [I26.99] DVT (deep venous thrombosis) ([facility] [I82.409] 
  
Current Room and Bed 332/01 
  
Admitting and Attending Info: 
Admitting Provider : Rosalia Castillo MD   NPI: [id number] Admitting Provider Phone. [phone] Admitting Provider Address: [address] 
  
Attending Provider Serafin Crowley MD   [id number] Attending Provider Address:  [address] 
  
Attending Provider Phone: Attending phys phone: [phone]

## 2021-04-22 NOTE — PROGRESS NOTES
Spiritual Care Assessment/Progress Note  [address]      NAME: Lisa Myers      MRN: [id number]  AGE: 47 y.o.  SEX: female  [place] Affiliation: Orthodoxy   Language: English     4/22/2021     Total Time (in minutes): 5     Spiritual Assessment begun in SF 3 PROG CARE TELE 2 through conversation with:         [x]Patient        [] Family    [] Friend(s)        Reason for Consult: [facility]     Spiritual beliefs: (Please include comment if needed)     [x] Identifies with a ponce tradition:  Orthodoxy      [x] Supported by a ponce community: [address] [place] [address]           [] Claims no spiritual orientation:           [] Seeking spiritual identity:                [] Adheres to an individual form of spirituality:           [] Not able to assess:                           Identified resources for coping:      [x] Prayer                               [x] Music                  [] Guided Imagery     [x] Family/friends                 [] Pet visits     [] Devotional reading                         [] Unknown     [] Other:                                              Interventions offered during this visit: (See comments for more details)    Patient Interventions: Affirmation of ponce, Communion (Orthodoxy), Initial/Spiritual assessment, patient floor, Prayer (actual), Prayer (assurance of)           Plan of Care:     [x] Support spiritual and/or cultural needs    [] Support AMD and/or advance care planning process      [] Support grieving process   [] Coordinate Rites and/or Rituals    [] Coordination with community clergy   [] No spiritual needs identified at this time   [] Detailed Plan of Care below (See Comments)  [] Make referral to Music Therapy  [] Make referral to Pet Therapy     [] Make referral to Addiction services  [] Make referral to [facility]  [] Make referral to Spiritual Care Partner  [] No future visits requested        [] Follow up upon further referrals     Comments: Ms. Shiv Corrales was in bed. She did not get much sleep las night. Prayer and communion offered.     Chrystine Pallas, SBS, RN, ACSW, LCSW   Page:  [phone]

## 2021-04-22 NOTE — PROGRESS NOTES
4/22/2021  11:48 AM    CM spoke with [facility] @ [employer] - could only provide the cost for the first 30-day supply of Xarelto ($40). Pharmacy unable to give cost of Eliquis as they will not have it in stock until tomorrow. CM met with patient to ensure $40/month would be affordable - patient confirmed that this is a cost she can cover.     Nadine Ortega RN

## 2021-04-22 NOTE — PROGRESS NOTES
RT evaluation for home oxygen need was completed. With rest the patient's room air O2 saturation was 97% with a heart rate of 110 bpm.  While ambulating in the hallway her lowest O2 saturation was 94%. Heart rate increased to 146 bpm.  No supplemental oxygen was indicated.

## 2021-04-22 NOTE — DISCHARGE SUMMARY
[facility] [place] [address]   Office [phone]  Fax [phone]       Discharge / Transfer / Off-Service Note     Name: Susana Edwards MRN: [id number]  Sex: Female   YOB: 1980  Age: 47 y.o. PCP: Nenita Alfaro MD     Date of admission: 4/21/2021  Date of discharge/transfer: 4/23/2021    Attending physician at admission: Yuly Petersen. Attending physician at discharge/transfer: Reyes Chino MD  Resident physician at discharge/transfer: Ursula Ruth DO     Consultants during hospitalization  None     Admission diagnoses   Pulmonary embolus ([facility] [I26.99]  DVT (deep venous thrombosis) ([facility] [I82.409]    Recommended follow-up after discharge    1. PCP-Deysi Dupree MD  2. Cardiology - Dr. Jonna Mendiola (consider f/u)     Things to follow up on with PCP:   -Consider f/u w/ cardiology to establish care locally for management/evaluation of tachycardia  -OCP d/c'd - consider alternative contraception that does not increase risk of hypercoagulability  -Monitor BP - consider restarting prinzide if elevated  -Re-evaluate O2 requirement w/ exertion   ----------------------------------------------------------------------------------------------------------------------------  The patient was well enough to be discharged from the hospital. However, because they were inpatient in a hospital, they are at greater risk of having been exposed to the coronavirus. PLEASE stay inside and self-quarantine for 14 days to prevent further spread of the coronavirus.  ------------------------------------------------------------------------------------------------------------------    Medication Changes:  START   Xarelto 15mg BID x21d  Xarelto 20mg daily to complete 3mo course     STOP   Prinzide  OCP     CHANGES   None     No other changes were made to your medications, please take all your other home medicines as previously prescribed.      History of Present Illness    As per admitting provider:     Logan Bishop is a 47 y.o. female with PMH significant for HTN, Dysmenorrhea on OCP, and Morbid Obesity who presents to the ER complaining of left lower leg pain. Pain started about 3 days before admission, started in the left thigh and knee, moved to the feet. By Monday, there was pain in the entire leg, pain comes mostly while walking, rates it 8/10, aching and numb in nature. Per patient, feels like a charley horse. When mobile, pain is constant. Denies having a similar symtom in the past. Denies hx of dvt or PE, no family history of DVT or PE. She was re-started on OCP about 3 months ago. She was taking off OCP 2-3 years ago due to elevated blood pressure. Denies n/v, dizziness, cp, sob, no loss of sensation in U/L extremity. LMP: 4/17.     COVID screening: NEGATIVE. Pt denies sick contacts or recent travel. Denies recent illness, fever, chills, sore throat, vision changes, HA, dizziness, cough , SOB, chest pain, dysuria, n/v/d, abdominal pain or extremity edema.         In the ER:   Patient Vitals for the past 8 hrs:    Temp Pulse Resp BP SpO2   04/21/21 2346 98.7 °F (37.1 °C) 96 16 104/76 96 %   04/21/21 2321  93      04/21/21 2028 98.8 °F (37.1 °C) 94 17 107/76 98 %   04/21/21 1936  (!) 102      04/21/21 1836 99.2 °F (37.3 °C)       04/21/21 1745   17 114/72 97 %   04/21/21 1725     92 %   04/21/21 1630    (!) 122/92 94 %        Vitals: as above  Labs: WBC 12.4, Ddimer 5.46, Trop Neg  Imaging:   · XR Knee - No acute process   · CTA Chest - Extensive b/l emboli. Saddle embolus. Right Heart Strain.     Treatment:  Therapeutic Lovenox     EKG: None      Hospital course  Logan Bishop was admitted into the Family Medicine Service from 4/21/2021 to 4/23/21 for Pulmonary embolus (HCC) [I26.99]  DVT (deep venous thrombosis) ([facility] [I82.409]. During the course of this admission, the following conditions were addressed/managed:    Provoked PE and DVT: D-dimer 5.46.  CTA w/ extensive b/l emboli, saddle embolus, and R Heart Strain, LE Doppler w/ L DVT. Trop neg x 2. PESI score Class 1 (low risk).  Risk factor includes OCP use, and Obesity. Echo w/ LVEF 60-65% w/ mild concentric hypertrophy. - S/p therapeutic Lovenox in the hospital  - Transition to xarelto upon discharge 15mg BID x3wks, 20mg daily thereafter for full 3mo course  - OCP d/c'd in hospital, discuss other methods of contraception that do not increase risk for hypercoagulability   - Pt desatted to mid-80s w/ exertion, sent home w/ home O2 (only for use w/ exertion)     Hypertension: POA BP was 122/90. Holding BP medication due to R heart strain seen on imaging, although noted to be a mild heart strain by IR (Dr. Nikki Tamez).    - Continue to hold home Prinzide 10-12.5 mg daily as pt has also been borderline hypotensive  - Monitor BP, may add BP med back as needed  - Continue home Metoprolol succinate 200mg daily     Chronic sinus tach: -112, had prior workup at [facility] that was negative for any cardiac abnormality.  - Continue metoprolol  200mg daily  - F/u w/ PCP to consider establishing w/ cardiology locally     GERD: Home omeprazole 40 mg daily. Obesity  - PT with BMI of Body mass index is 37.77 kg/m². - Encouraging lifestyle modifications and further follow up outpatient. Physical exam at discharge:    Vitals Reviewed. Patient Vitals for the past 12 hrs:   Temp Pulse Resp BP SpO2   04/23/21 0700  (!) 106      04/23/21 0305 99.1 °F (37.3 °C) 96 16 109/73 96 %   04/22/21 2253  94      04/22/21 2231 99.1 °F (37.3 °C) 98 16 109/74 94 %   04/22/21 2058 98.7 °F (37.1 °C) 97 18 124/87 95 %        General: No acute distress. Alert. Cooperative. Head: Normocephalic. Atraumatic. Eyes:              Conjunctiva pink. Sclera white. Ears:              Hearing intact. No drainage. Nose:             Septum midline. Mucosa pink. No drainage. Throat: Mucosa pink. Moist mucous membranes.  No tonsillar exudates or erythema. Palate movement equal bilaterally. Neck: Supple. Normal ROM. No stiffness. Respiratory: No use of accessory muscles, good inspiratory effort. Cardiovascular: Tachycardic, regular rhythm. No m/r/g.    GI: + bowel sounds. Nontender. No rebound tenderness or guarding. Nondistended. Extremities: No LE edema. Distal pulses intact. Left Calf TTP improved. Musculoskeletal: Full ROM in all extremities. Skin: Warm, dry. No rashes. Neuro: No FND. Clear speech. Condition at discharge: Stable. Labs  Recent Labs     04/23/21  0320 04/22/21  0440 04/21/21  1554   WBC 9.1 9.5 12.4*   HGB 12.7 13.5 14.2   HCT 36.6 38.7 39.5    242 242     Recent Labs     04/23/21  0320 04/22/21 0440 04/21/21  1554   * 136 136   K 3.7 3.1* 4.1    101 95*   CO2 28 29 31   BUN 6 6 7   CREA 0.57 0.63 0.89   GLU 93 90 114*   CA 9.3 9.4 9.6   MG  --  2.5*  --      Recent Labs     04/21/21  1554   ALT 21   AP 49   TBILI 0.7   TP 8.1   ALB 3.5   GLOB 4.6*     Recent Labs     04/22/21  0440 04/21/21  2155 04/21/21  1554   TROIQ <0.05 <0.05 <0.05   INR  --   --  1.0   PTP  --   --  10.3       Micro:  Lab Results   Component Value Date/Time    Culture result: ESCHERICHIA COLI (A) 04/14/2021 04:37 PM       Imaging:  Cta Chest W Or W Wo Cont    Result Date: 4/21/2021  INDICATION: Acute DVT. Evaluate for PE COMPARISON: None TECHNIQUE:  2.5 mm axial images were obtained from the bases to the lung apices after the intravenous administration of 100 cc of Isovue-370. Three-dimensional postprocessing was performed by the technologist with MIP reconstructions. CT dose reduction was achieved through use of a standardized protocol tailored for this examination and automatic exposure control for dose modulation. FINDINGS: THYROID: No nodule. MEDIASTINUM: No mass or lymphadenopathy. GEOFFREY: No mass or lymphadenopathy. THORACIC AORTA: No dissection or aneurysm.  MAIN PULMONARY ARTERY: Extensive bilateral pulmonary emboli. Saddle embolus TRACHEA/BRONCHI: Patent. ESOPHAGUS: No wall thickening or dilatation. HEART: Not enlarged however the interventricular septum is deviated towards the left ventricle suggesting heart strain PLEURA: No effusion or pneumothorax. LUNGS: No nodule, mass, or airspace disease. INCIDENTALLY IMAGED UPPER ABDOMEN: Geographic low density left hepatic lobe BONES: No destructive bone lesion. Extensive bilateral pulmonary emboli. Saddle embolus. Right heart strain Geographic low density within the left hepatic lobe. May represent focal fatty infiltration. Infarct is felt to be less likely. This could be evaluated with nonemergent MR imaging The findings were called to Dr. Ozzy Marsh on 4/21/2021 at [phone] by myself. [address] [place] [address] [address] 3 V    Result Date: 4/21/2021  EXAM: XR KNEE LT 3 V INDICATION: Twisted knee. COMPARISON: None. FINDINGS: Three views of the left knee demonstrate no fracture or other acute osseous or articular abnormality. There is no effusion. No acute abnormality. Duplex Lower Ext Venous Left    Result Date: 4/21/2021  · Acute thrombus present in the left common femoral vein. · Acute thrombus present in the left proximal femoral vein. · Acute thrombus present in the left mid femoral vein. · Acute thrombus present in the left distal femoral vein. · Acute thrombus present in the left popliteal vein.         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 rivaroxaban (XARELTO) 15 mg tab tablet Take 1 Tab by mouth two (2) times daily (with meals) for 21 days. Indications: blood clot in a deep vein of the extremities, a clot in the lung  Qty: 42 Tab, Refills: 0      !! rivaroxaban (XARELTO) 20 mg tab tablet Take 1 Tab by mouth daily (with breakfast) for 30 days. Indications: blood clot in a deep vein of the extremities, a clot in the lung  Qty: 30 Tab, Refills: 0       !! - Potential duplicate medications found. Please discuss with provider. CONTINUE these medications which have NOT CHANGED    Details   omeprazole (PRILOSEC) 40 mg capsule Take 1 Cap by mouth daily. Qty: 90 Cap, Refills: 0    Associated Diagnoses: Gastroesophageal reflux disease without esophagitis; Belching      metoprolol succinate (TOPROL-XL) 200 mg XL tablet TAKE 1 TABLET BY MOUTH EVERY DAY  Qty: 30 Tab, Refills: 3    Associated Diagnoses: Benign essential HTN         STOP taking these medications       lisinopril-hydroCHLOROthiazide (PRINZIDE, ZESTORETIC) 10-12.5 mg per tablet Comments:   Reason for Stopping:         norethindrone-e estradiol-iron (Blisovi 24 Fe) 1 mg-20 mcg (24)/75 mg (4) tab Comments:   Reason for Stopping:                Diet:  Regular diet.     Activity:  As tolerated     Disposition:      Discharge instructions to patient/family  Please seek medical attention for any new or worsening symptoms particularly fever, chest pain, shortness of breath, abdominal pain, nausea, vomiting    Follow up plans/appointments  Follow-up Information     Follow up With Specialties Details Why Contact Tenzin Adames MD Family Medicine On 4/26/2021 [facility] follow up appointment - 8:30 AM - your doctor vicky ortega for this appointment [facility] [address]  [address] [facility]  [phone]             Leila Nguyen MD  Family Medicine Resident       For Billing    Chief Complaint   Patient presents with   [facility] Leg Pain     left       Hospital Problems  Date Reviewed: 4/14/2021          Codes Class Noted POA    * (Principal) Pulmonary embolus [facility]) ICD-10-CM: I26.99  ICD-9-CM: 415.19  4/21/2021 Unknown        DVT (deep venous thrombosis) [facility]) ICD-10-CM: I82.409  ICD-9-CM: 453.40  4/21/2021 Unknown               [address] Medicine Residency Attending Addendum:  I saw and evaluated the patient on the day of the encounter with Dr. Simon Houston, performing the key elements of the service. I discussed the findings, assessment and plan with the resident and agree with the resident's findings and plan as documented in the resident's note.       Charles Valle MD, FAAFP, CAQSM, RMSK

## 2021-04-22 NOTE — PROGRESS NOTES
Rounded on [facility] patients and provided Anointing of the Sick at request of patient.     Syeda Batista

## 2021-04-22 NOTE — PROGRESS NOTES
Received to [id number] via stretcher. Ambulated to bed. A/Ox4. Denies any pain. VSS. Placed on CMonitor. See admission interventions.

## 2021-04-22 NOTE — PROGRESS NOTES
[address] [place] [address]   Office [phone]  Fax [phone]          Assessment and Plan     Qi March is a 47 y.o. female with PMH of HTN, Dysmenorrhea on OCP, and Morbid Obesity who is admitted for PE/DVT. 24 Hour Events: No acute events.       PE: Pt is HDS. Required 1 L of oxygen after saturation went down to 90-92 % on RA. D-dimer 5.46. CTA w/ extensive b/l emboli, saddle embolus, and R Heart Strain. Trop neg x 2. PESI score Class 1 (low risk). Risk factor includes OCP use, and Obesity.    - CBC and BMP daily   - Therapeutic Lovenox  - Monitor O2, O2 prn  - Follow up on Lipid Panel  - Follow up on EKG and  ECHO   - DC OCP outpt     DVT: LE Doppler w/ L DVT. Patient's risk factor VTE are obesity, and OCP use. No hx of cancer or hx of thrombophilia or immobility.   - Treatment per above     Hypertension: POA BP was 122/90. Holding BP medication due to R heart strain seen on imaging, although noted to be a mild heart strain by IR (Dr. Sydnie Harvey). In case patient becomes HD unstable due to the heart strain, do not want to decrease blood pressure further.   - Hold home Prinzide 10-12.5 mg daily   - Hold home Metoprolol succinate daily   - Will continue to monitor at this time and readjust as BP's trend.     GERD: Home omeprazole 40 mg daily.  - Protonix 40 mg daily     Obesity  - PT with BMI of Body mass index is 37.77 kg/m². - Encouraging lifestyle modifications and further follow up outpatient.        FEN/GI - Cardiac diet. Activity - Ambulate as tolerated  DVT prophylaxis - Therapeutic Lovenox  GI prophylaxis - Protonix  Fall prophylaxis - Not indicated at this time. Disposition - Admit to Telemetry. Plan to d/c to Home. Code Status - Full, discussed with patient / caregivers. Next of Millyhariniyanira Baez Name and Contact : Raven Carvalho: [phone]    Jake Genao MD  Family Medicine Resident         Subjective / Objective     Subjective  Patient was evaluated at bedside.  Denies chest pain, shortness of breath, n/v. Pt was able to eat some crackers and drink some soda. No concerns at this time. Endorses improvement in the pain in her left leg. Objective  Respiratory: O2 Flow Rate (L/min): 1 l/min O2 Device: None (Room air)   Visit Vitals  /73 (BP 1 Location: Right upper arm, BP Patient Position: At rest)   Pulse 96   Temp 98.8 °F (37.1 °C)   Resp 16   Ht 5' (1.524 m)   Wt 193 lb 6.4 oz (87.7 kg)   SpO2 95%   Breastfeeding No   BMI 37.77 kg/m²       General: No acute distress. Alert. Cooperative. Head: Normocephalic. Atraumatic. Eyes:              Conjunctiva pink. Sclera white. PERRL. Ears:              Hearing intact. No drainage. Nose:             Septum midline. Mucosa pink. No drainage. Throat: Mucosa pink. Moist mucous membranes. No tonsillar exudates or erythema. Palate movement equal bilaterally. Neck: Supple. Normal ROM. No stiffness. Respiratory: No use of accessory muscles, good inspiratory effort. Cardiovascular: Tachycardic No m/r/g.    GI: + bowel sounds. Nontender. No rebound tenderness or guarding. Nondistended. Extremities:  No LE edema. Distal pulses intact. Tenderness in the Left Calf   Musculoskeletal: Full ROM in all extremities. Skin: Warm, dry. No rashes. Neuro: No FND. Clear speech. I/O:  Date 04/21/21 0700 - 04/22/21 0659 04/22/21 0700 - 04/23/21 0659   Shift 3597-3487 8654-1252 24 Hour Total 2834-4061 8623-8484 24 Hour Total   INTAKE   P.O.  480 480        P. O.  480 480      Shift Total(mL/kg)  480(5.5) 480(5.5)      OUTPUT   Urine(mL/kg/hr)           Urine Occurrence(s)  1 x 1 x      Shift Total(mL/kg)         NET  480 480      Weight (kg) 86.8 87.7 87.7 87.7 87.7 87.7       CBC:  Recent Labs     04/21/21  1554   WBC 12.4*   HGB 14.2   HCT 39.5          Metabolic Panel:  Recent Labs     04/21/21  1554      K 4.1   CL 95*   CO2 31   BUN 7   CREA 0.89   *   CA 9.6   ALB 3.5   ALT 21   INR 1.0 For Billing    Chief Complaint   Patient presents with    Leg Pain     left       Hospital Problems  Date Reviewed: 4/14/2021          Codes Class Noted POA    Pulmonary embolus ([facility] ICD-10-CM: I26.99  ICD-9-CM: 415.19  4/21/2021 Unknown        DVT (deep venous thrombosis) ([facility] ICD-10-CM: I82.409  ICD-9-CM: 453.40  4/21/2021 Unknown

## 2021-04-22 NOTE — ACP (ADVANCE CARE PLANNING)
4/22/2021  9:53 AM    Advance Care Planning     Advance Care Planning Activator (Inpatient)  Conversation Note      Date of ACP Conversation: 04/22/21     Conversation Conducted with:  Patient, Yeni Duffy    ACP Activator: Marlo Hampton Decision Maker:    Current Designated Health Care Decision Maker:     Primary Decision Maker: Rodo    - [phone]      Care Preferences    Ventilation: \"If you were in your present state of health and suddenly became very ill and were unable to breathe on your own, what would your preference be about the use of a ventilator (breathing machine) if it were available to you? \"      If patient would desire the use of a ventilator (breathing machine), answer \"yes\", if not \"no\":yes    \"If your health worsens and it becomes clear that your chance of recovery is unlikely, what would your preference be about the use of a ventilator (breathing machine) if it were available to you? \"     Would the patient desire the use of a ventilator (breathing machine)? YES      Resuscitation  \"CPR works best to restart the heart when there is a sudden event, like a heart attack, in someone who is otherwise healthy. Unfortunately, CPR does not typically restart the heart for people who have serious health conditions or who are very sick. \"    \"In the event your heart stopped as a result of an underlying serious health condition, would you want attempts to be made to restart your heart (answer \"yes\" for attempt to resuscitate) or would you prefer a natural death (answer \"no\" for do not attempt to resuscitate)? \" yes      [] Yes  [x] No   Educated Patient / Kathia Goldberg regarding differences between Advance Directives and portable DNR orders.     Length of ACP Conversation in minutes: 5 min    Conversation Outcomes:  [x] ACP discussion completed  [] Existing advance directive reviewed with patient; no changes to patient's previously recorded wishes     [] New Advance Directive completed   [] Portable Do Not Resuscitate prepared for Provider review and signature  [] POLST/POST/MOLST/MOST prepared for Provider review and signature      Follow-up plan:    [] Schedule follow-up conversation to continue planning  [] Referred individual to Provider for additional questions/concerns   [] Advised patient/agent/surrogate to review completed ACP document and update if needed with changes in condition, patient preferences or care setting     [] This note routed to one or more involved healthcare providers    Star Verdugo RN

## 2021-04-23 VITALS
HEIGHT: 60 IN | SYSTOLIC BLOOD PRESSURE: 145 MMHG | BODY MASS INDEX: 38.09 KG/M2 | HEART RATE: 98 BPM | RESPIRATION RATE: 14 BRPM | OXYGEN SATURATION: 100 % | TEMPERATURE: 98.6 F | WEIGHT: 194 LBS | DIASTOLIC BLOOD PRESSURE: 79 MMHG

## 2021-04-23 LAB
ANION GAP SERPL CALC-SCNC: 5 MMOL/L (ref 5–15)
BASOPHILS # BLD: 0 K/UL (ref 0–0.1)
BASOPHILS NFR BLD: 0 % (ref 0–1)
BUN SERPL-MCNC: 6 MG/DL (ref 6–20)
BUN/CREAT SERPL: 11 (ref 12–20)
CALCIUM SERPL-MCNC: 9.3 MG/DL (ref 8.5–10.1)
CHLORIDE SERPL-SCNC: 102 MMOL/L (ref 97–108)
CO2 SERPL-SCNC: 28 MMOL/L (ref 21–32)
CREAT SERPL-MCNC: 0.57 MG/DL (ref 0.55–1.02)
DIFFERENTIAL METHOD BLD: ABNORMAL
EOSINOPHIL # BLD: 0.2 K/UL (ref 0–0.4)
EOSINOPHIL NFR BLD: 2 % (ref 0–7)
ERYTHROCYTE [DISTWIDTH] IN BLOOD BY AUTOMATED COUNT: 12.6 % (ref 11.5–14.5)
GLUCOSE SERPL-MCNC: 93 MG/DL (ref 65–100)
HCT VFR BLD AUTO: 36.6 % (ref 35–47)
HGB BLD-MCNC: 12.7 G/DL (ref 11.5–16)
IMM GRANULOCYTES # BLD AUTO: 0.1 K/UL (ref 0–0.04)
IMM GRANULOCYTES NFR BLD AUTO: 1 % (ref 0–0.5)
LYMPHOCYTES # BLD: 2.4 K/UL (ref 0.8–3.5)
LYMPHOCYTES NFR BLD: 26 % (ref 12–49)
MCH RBC QN AUTO: 38.4 PG (ref 26–34)
MCHC RBC AUTO-ENTMCNC: 34.7 G/DL (ref 30–36.5)
MCV RBC AUTO: 110.6 FL (ref 80–99)
MONOCYTES # BLD: 0.6 K/UL (ref 0–1)
MONOCYTES NFR BLD: 7 % (ref 5–13)
NEUTS SEG # BLD: 5.9 K/UL (ref 1.8–8)
NEUTS SEG NFR BLD: 65 % (ref 32–75)
NRBC # BLD: 0 K/UL (ref 0–0.01)
NRBC BLD-RTO: 0 PER 100 WBC
PLATELET # BLD AUTO: 250 K/UL (ref 150–400)
PMV BLD AUTO: 9.1 FL (ref 8.9–12.9)
POTASSIUM SERPL-SCNC: 3.7 MMOL/L (ref 3.5–5.1)
RBC # BLD AUTO: 3.31 M/UL (ref 3.8–5.2)
SODIUM SERPL-SCNC: 135 MMOL/L (ref 136–145)
WBC # BLD AUTO: 9.1 K/UL (ref 3.6–11)

## 2021-04-23 PROCEDURE — 94760 N-INVAS EAR/PLS OXIMETRY 1: CPT

## 2021-04-23 PROCEDURE — 97116 GAIT TRAINING THERAPY: CPT

## 2021-04-23 PROCEDURE — 74011250637 HC RX REV CODE- 250/637: Performed by: STUDENT IN AN ORGANIZED HEALTH CARE EDUCATION/TRAINING PROGRAM

## 2021-04-23 PROCEDURE — 97530 THERAPEUTIC ACTIVITIES: CPT

## 2021-04-23 PROCEDURE — 36415 COLL VENOUS BLD VENIPUNCTURE: CPT

## 2021-04-23 PROCEDURE — 99238 HOSP IP/OBS DSCHRG MGMT 30/<: CPT | Performed by: FAMILY MEDICINE

## 2021-04-23 PROCEDURE — 80048 BASIC METABOLIC PNL TOTAL CA: CPT

## 2021-04-23 PROCEDURE — 74011250636 HC RX REV CODE- 250/636: Performed by: STUDENT IN AN ORGANIZED HEALTH CARE EDUCATION/TRAINING PROGRAM

## 2021-04-23 PROCEDURE — 85025 COMPLETE CBC W/AUTO DIFF WBC: CPT

## 2021-04-23 PROCEDURE — 97162 PT EVAL MOD COMPLEX 30 MIN: CPT

## 2021-04-23 RX ADMIN — Medication 10 ML: at 15:01

## 2021-04-23 RX ADMIN — ENOXAPARIN SODIUM 90 MG: 100 INJECTION SUBCUTANEOUS at 15:22

## 2021-04-23 RX ADMIN — METOPROLOL SUCCINATE 200 MG: 50 TABLET, EXTENDED RELEASE ORAL at 09:35

## 2021-04-23 RX ADMIN — PANTOPRAZOLE SODIUM 40 MG: 40 TABLET, DELAYED RELEASE ORAL at 09:34

## 2021-04-23 RX ADMIN — Medication 10 ML: at 05:29

## 2021-04-23 RX ADMIN — ENOXAPARIN SODIUM 90 MG: 100 INJECTION SUBCUTANEOUS at 05:25

## 2021-04-23 NOTE — PROGRESS NOTES
PHYSICAL THERAPY EVALUATION/DISCHARGE  Patient: Jose Elias Gordon (19 y.o. female)  Date: 4/23/2021  Primary Diagnosis: Pulmonary embolus (HCC) [I26.99]  DVT (deep venous thrombosis) (HCC) [I82.409]       Precautions: Universal         ASSESSMENT  Based on the objective data described below, the patient presents with independent functional mobility and new supplemental O2 need on day 2 of admission with LLE DVT and B PE with R heart strain per imaging. Skilled PT not indicated and O2 needs discussed with RN and attending provider. Documentation for home O2:     ROOM AIR    AT REST   O2 SATS  96% HR  110   ROOM AIR WITH ACTIVITY 02 SATS  96% with flat surface ambulation;  85% with stair negotiation + mild dyspnea HR  125 with flat surface ambulation  132 with stair negotiation     (2    ) LITERS OF O2 WITH ACTIVITY (stair negotiation) O2 SATS  95% HR  125   Room air PATIENT LEFT COMFORTABLY  SITTING/SUPINE 02 SATS  96% HR  112           Functional Outcome Measure: The patient scored 100 on the Barthel outcome measure which is indicative of 0% functional impairment. Other factors to consider for discharge: none additional     Further skilled acute physical therapy is not indicated at this time. PLAN :  Recommendation for discharge: (in order for the patient to meet his/her long term goals)  No skilled physical therapy/ follow up rehabilitation needs identified at this time. This discharge recommendation:  Has been made in collaboration with the attending provider and/or case management    IF patient discharges home will need the following DME: none       SUBJECTIVE:   Patient stated I still feel some pain where I had the worst andrae-horse. What is that from?  Pt educated regarding typical progression of symptoms after DVT and anticoagulation. Pt received seated, agreeable to PT and cleared by RN.       OBJECTIVE DATA SUMMARY:   HISTORY:    Past Medical History:   Diagnosis Date    Abnormal Pap smear of cervix 12/2020    LGSIL/+HPV    Benign essential HTN 7/10/2019    Dysmenorrhea 8/19/2016    Edema of both legs 7/31/2014    Hypertension      Past Surgical History:   Procedure Laterality Date    HX HEENT      tonsillectomy       Prior level of function: independent at community level, drives, climbs several flights of stairs at her job. Personal factors and/or comorbidities impacting plan of care: as above    Home Situation  Home Environment: Private residence  # Steps to Enter: 2  Rails to Enter: Yes  Wheelchair Ramp: Yes  One/Two Story Residence: One story  Living Alone: Yes  Support Systems: Family member(s)  Patient Expects to be Discharged to[de-identified] Private residence  Current DME Used/Available at Home: None    EXAMINATION/PRESENTATION/DECISION MAKING:   Critical Behavior:  Neurologic State: Alert  Orientation Level: Oriented X4  Cognition: Follows commands, Appropriate for age attention/concentration     Hearing: Auditory  Auditory Impairment: None  Skin:  LE exposed skin intact  Edema: +LLE  Range Of Motion:  AROM: Within functional limits           PROM: Within functional limits           Strength:    Strength: Within functional limits                    Tone & Sensation:   Tone: Normal              Sensation: Intact               Coordination:  Coordination: Within functional limits  Functional Mobility:                Transfers:  Sit to Stand: Independent  Stand to Sit: Independent                       Balance:   Sitting: Intact  Standing: Intact  Ambulation/Gait Training:  Distance (ft): 400 Feet (ft)     Ambulation - Level of Assistance: Independent        Gait Abnormalities: Antalgic            after initial ambulation and SpO2 results author discussed case with attending team via phone; received verbal instructions for walking oximetry with clearance to titrate up to 4L/min if required.                         Stairs:  Number of Stairs Trained: (6 x 4 trials)  Stairs - Level of Assistance: Independent   Rail Use: (R and none)      Functional Measure:  Barthel Index:    Bathin  Bladder: 10  Bowels: 10  Groomin  Dressing: 10  Feeding: 10  Mobility: 15  Stairs: 10  Toilet Use: 10  Transfer (Bed to Chair and Back): 15  Total: 100/100       The Barthel ADL Index: Guidelines  1. The index should be used as a record of what a patient does, not as a record of what a patient could do. 2. The main aim is to establish degree of independence from any help, physical or verbal, however minor and for whatever reason. 3. The need for supervision renders the patient not independent. 4. A patient's performance should be established using the best available evidence. Asking the patient, friends/relatives and nurses are the usual sources, but direct observation and common sense are also important. However direct testing is not needed. 5. Usually the patient's performance over the preceding 24-48 hours is important, but occasionally longer periods will be relevant. 6. Middle categories imply that the patient supplies over 50 per cent of the effort. 7. Use of aids to be independent is allowed. Cesar Ramsay., Barthel, D.W. ([id number]). Functional evaluation: the Barthel Index. [address] [facility] (14)2. Beau Hernandez, BERNAJ.M.F, Marisela Sol., Ean Gunn., [place], [address] [place] [address] (). Measuring the change indisability after inpatient rehabilitation; comparison of the responsiveness of the Barthel Index and Functional Dyer Measure. Journal of Neurology, Neurosurgery, and Psychiatry, 66(4), 391-147. Murali Fonseca, N.J.NANDA, CARMINE Hernandez, & Shane Hinton, M.A. (2004.) Assessment of post-stroke quality of life in cost-effectiveness studies: The usefulness of the Barthel Index and the EuroQoL-5D.  Quality of Life Research, 15, 529-63            Physical Therapy Evaluation Charge Determination   History Examination Presentation Decision-Making   LOW Complexity : Zero comorbidities / personal factors that will impact the outcome / POC HIGH Complexity : 4+ Standardized tests and measures addressing body structure, function, activity limitation and / or participation in recreation  MEDIUM Complexity : Evolving with changing characteristics  Other outcome measures barthel  LOW       Based on the above components, the patient evaluation is determined to be of the following complexity level: LOW     Pain Rating:  3/10 LLE associated with DVT. Activity Tolerance:   Good      After treatment patient left in no apparent distress:   Sitting in chair and Call bell within reach    COMMUNICATION/EDUCATION:   The patients plan of care was discussed with: Registered nurse and attending provider; care manager. Fall prevention education was provided and the patient/caregiver indicated understanding.     Thank you for this referral.  Cassia Staley, PT, DPT   Time Calculation: 35 mins

## 2021-04-23 NOTE — DISCHARGE INSTRUCTIONS
HOME DISCHARGE INSTRUCTIONS    Krystal Reed / [id number] : 1980    Admission date: 2021 Discharge date: 2021      Please bring this form with you to show your care provider at your follow-up appointment. Primary care provider:  Umer Boykin MD    Discharging provider:  Vivien Kearney MD  - Family Medicine Resident  Karthikeyan Abernathy MD - [facility] Medicine Attending      You have been admitted to the hospital with the following diagnoses:    ACUTE DIAGNOSES:  Pulmonary embolus ([facility] [I26.99]  [facility]) [facility] [I82.409]    . . . . . . . . . . . . . . . . . . . . . . . . . . . . . . . . . . . . . . . . . . . . . . . . . . . . . . . . . . . . . . . . . . . . . . . .   You are well enough to be discharged from the hospital. However, because you were inpatient in a hospital, you are at greater risk of having been exposed to the coronavirus. PLEASE stay inside and self-quarantine for 14 days to prevent further spread of the coronavirus. . . . . . . . . . . . . . . . . . . . . . . . . . . . . . . . . . . . . . . . . . . . . . . . . . . . . . . . . . . . . . . . . . . . . . . . . MEDICATION CHANGES  START  - Xarelto 15 mg twice daily with food for 21 days (start , last day ), then 20 mg once daily with food - this is a blood thinner, you will need to be on this medication for 3 months    STOP  Prinzide  Oral contraceptive pill      CHANGES  None    No other changes were made to your medications, please take all your other home medicines as previously prescribed. . . . . . . . . . . . . . . . . . . . . . . . . . . . . . . . . . . . . . . . . . . . . . . . . . . . . . . . . . . . . . . . . . . . . . . . Ritchie Jolley      FOLLOW-UP CARE RECOMMENDATIONS:    Appointments  Follow-up Information     Follow up With Specialties Details Why Contact Info    Umer Boykin MD Family Medicine On 2021 [facility] follow up appointment - 8:30 AM - your doctor willl mario you for this appointment [address]  [phone]             Follow-up tests needed:   -talk to your doctor about alternative methods of contraception that do not increase your risk of forming a blood clot  -monitor your blood pressure, if it is elevated you may need to restart your prinzide  -consider following up with a cardiologist locally to monitor and evaluate your elevated heart rate    Pending test results: At the time of your discharge the following test results are still pending: none. Please make sure you review these results with your outpatient follow-up provider(s). DIET/what to eat:  Regular Diet    ACTIVITY:  Activity as tolerated    Wound care: None needed    Equipment needed:  None needed    Specific symptoms to watch for: chest pain, shortness of breath, fever, chills, nausea, vomiting, diarrhea, change in mentation, falling, weakness, bleeding. What to do if new or unexpected symptoms occur? If you experience any of the above symptoms (or should other concerns or questions arise after discharge) please call your primary care physician. Return to the emergency room if you cannot get hold of your doctor. · It is very important that you keep your follow-up appointment(s). · Please bring discharge papers, medication list (and/or medication bottles) to your follow-up appointments for review by your outpatient provider(s). · Please check the list of medications and be sure it includes every medication (even non-prescription medications) that your provider wants you to take. · It is important that you take the medication exactly as they are prescribed. · Keep your medication in the bottles provided by the pharmacist and keep a list of the medication names, dosages, and times to be taken in your wallet. · Do not take other medications without consulting your doctor.    · If you have any questions about your medications or other instructions, please talk to your nurse or care provider before you leave the hospital.     Information obtained by:     I understand that if any problems occur once I am at home I am to contact my physician. These instructions were explained to me and I had the opportunity to ask questions. I understand and acknowledge receipt of the instructions indicated above. [de-identified] or R.N.'s Signature                                                                  Date/Time                                                                                                                                              Patient or Representative Signature                                                          Date/Time      Patient Education        Deep Vein Thrombosis: Care Instructions  Overview     A deep vein thrombosis (DVT) is a blood clot in certain veins, usually in the legs, pelvis, or arms. Blood clots in these veins need to be treated because they can get bigger, break loose, and travel through the bloodstream to the lungs. A blood clot in a lung can be life-threatening. The doctor may have given you a blood thinner (anticoagulant). A blood thinner can stop the blood clot from growing larger and prevent new clots from forming. You will need to take a blood thinner for at least 3 months. The doctor has checked you carefully, but problems can develop later. If you notice any problems or new symptoms, get medical treatment right away. Follow-up care is a key part of your treatment and safety. Be sure to make and go to all appointments, and call your doctor if you are having problems. It's also a good idea to know your test results and keep a list of the medicines you take. How can you care for yourself at home? · Take your medicines exactly as prescribed.  Call your doctor if you think you are having a problem with your medicine. · If you are taking a blood thinner, be sure you get instructions about how to take your medicine safely. Blood thinners can cause serious bleeding problems. · Try to walk several times a day. · Wear compression stockings if your doctor recommends them. These stockings are tighter at the feet than on the legs. They may reduce pain and swelling in your legs. But there are different types of stockings, and they need to fit right. So your doctor will recommend what you need. · When you sit, use a pillow to raise the arm or leg that has the blood clot. Try to keep it above the level of your heart. When should you call for help? Call 911 anytime you think you may need emergency care. For example, call if:    · You passed out (lost consciousness).     · You have symptoms of a blood clot in your lung (called a pulmonary embolism). These include:  ? Sudden chest pain. ? Trouble breathing. ? Coughing up blood. Call your doctor now or seek immediate medical care if:    · You have new or worse trouble breathing.     · You are dizzy or lightheaded, or you feel like you may faint.     · You have symptoms of a blood clot in your arm or leg. These may include:  ? Pain in the arm, calf, back of the knee, thigh, or groin. ? Redness and swelling in the arm, leg, or groin. Watch closely for changes in your health, and be sure to contact your doctor if:    · You do not get better as expected. Where can you learn more? Go to http://www.gray.com/  Enter R543 in the search box to learn more about \"Deep Vein Thrombosis: Care Instructions. \"  Current as of: March 4, 2020               Content Version: 12.8  © 2925-3507 [employer]. Care instructions adapted under license by [employer] (which disclaims liability or warranty for this information).  If you have questions about a medical condition or this instruction, always ask your healthcare professional. [place] [address] any warranty or liability for your use of this information. Patient Education   Patient Education        8 Things You Can Do to Help Prevent Blood Clots  A blood clot in a deep vein, usually in the legs, is called a deep vein thrombosis (DVT). A DVT can break loose and travel through the bloodstream to the lungs. Then the clot can block blood flow in the lungs (pulmonary embolism). This can be life-threatening. That's why it's important to take steps to prevent a clot. Take a blood-thinning medicine (called an anticoagulant), if prescribed. If your doctor prescribes medicine, take it as directed. Exercise your lower leg muscles. This helps keep the blood moving through your legs. Point your toes up toward your head so the calves of your legs are stretched, then relax. Repeat. This is a good exercise to do when you are sitting for long periods of time. Get up out of bed as soon as your doctor says it's okay. Being active is one of the best things you can do to prevent clots. Take plenty of breaks when you travel. On long car trips, stop the car and walk around every hour or so. On the bus, plane, or train, get out of your seat and walk up and down the aisle every hour, if you can. Be active. Try to get 30 minutes or more of activity on most days of the week. Don't smoke. Smoking can increase your risk of blood clots. If you need help quitting, talk to your doctor about stop-smoking programs and medicines. Check with your doctor about whether you should use hormone forms of birth control or hormone therapy. These may increase your risk of blood clots. Use compression stockings if your doctor prescribes them. These are specially fitted stockings that may prevent blood clots by keeping blood from pooling in your legs.    Current as of: May 27, 2020               Content Version: 12.8  © 7712-6396 [employer]. Care instructions adapted under license by [employer] (which disclaims liability or warranty for this information). If you have questions about a medical condition or this instruction, always ask your healthcare professional. [address] any warranty or liability for your use of this information. Pulmonary Embolism: Care Instructions  Overview     Pulmonary embolism is the sudden blockage of an artery in the lung. Blood clots in the deep veins of the leg or pelvis (deep vein thrombosis, or DVT) are the most common cause. These blood clots can travel to the lungs. Pulmonary embolism can be very serious. Because you have had one pulmonary embolism, you are at greater risk for having another one. But you can take steps to prevent another pulmonary embolism by following your doctor's instructions. You will probably take a prescription blood-thinning medicine to prevent blood clots. A blood thinner can stop a blood clot from growing larger and prevent new clots from forming. Follow-up care is a key part of your treatment and safety. Be sure to make and go to all appointments, and call your doctor if you are having problems. It's also a good idea to know your test results and keep a list of the medicines you take. How can you care for yourself at home? · Take your medicines exactly as prescribed. Call your doctor if you think you are having a problem with your medicine. You will get more details on the specific medicines your doctor prescribes. · If you are taking a blood thinner, be sure you get instructions about how to take your medicine safely. Blood thinners can cause serious bleeding problems. Preventing future pulmonary embolisms  · Exercise. Keep blood moving in your legs to keep clots from forming. If you are traveling by car, stop every hour or so. Get out and walk around for a few minutes.  If you are traveling by bus, train, or plane, get out of your seat and walk up and down the aisles every hour or so. You also can do leg exercises while you are seated. Pump your feet up and down by pulling your toes up toward your knees then pointing them down. · Get up out of bed as soon as possible after an illness or surgery. · Do not smoke. If you need help quitting, talk to your doctor about stop-smoking programs and medicines. These can increase your chances of quitting for good. · Check with your doctor before taking hormone or birth control pills. These may increase your risk of blood clots. · Ask your doctor about wearing compression stockings to help prevent blood clots in your legs. There are different types of stockings, and they need to fit right. So your doctor will recommend what you need. When should you call for help? Call 911 anytime you think you may need emergency care. For example, call if:    · You have shortness of breath.     · You have chest pain.     · You passed out (lost consciousness).     · You cough up blood. Call your doctor now or seek immediate medical care if:    · You have new or worsening pain or swelling in your leg. Watch closely for changes in your health, and be sure to contact your doctor if:    · You do not get better as expected. Where can you learn more? Go to http://www.gray.com/  Enter W531 in the search box to learn more about \"Pulmonary Embolism: Care Instructions. \"  Current as of: March 4, 2020               Content Version: 12.8  © 7044-0294 [employer]. Care instructions adapted under license by [employer] (which disclaims liability or warranty for this information). If you have questions about a medical condition or this instruction, always ask your healthcare professional. [place] [address] any warranty or liability for your use of this information.

## 2021-04-23 NOTE — PROGRESS NOTES
2000 Bedside and Verbal shift change report given to [address] [place] [address] (oncoming nurse) by Florentin Dia RN (offgoing nurse). Report included the following information SBAR, Kardex and MAR.     0330 AM labs collected. This patient was assisted with Intentional Toileting every 2 hours during this shift. Documentation of ambulation and output reflected on Flowsheet. 0800 Bedside and Verbal shift change report given to Jossy Espana RN (oncoming nurse) by Dian Hutchison RN (offgoing nurse). Report included the following information SBAR, Kardex and MAR.

## 2021-04-23 NOTE — PROGRESS NOTES
1:34 PM   04/23/21     RUR 8%  Low     Transition of Care Plan:          1. [facility] to deliver O2 concentrator to Patient room prior to discharge   2. Patient lives alone will go home with her mother at discharge   3. Mother to transport at discharge  4.  Patient will follow up with PCP and specialities upon discharge     [facility] RN CCM

## 2021-04-23 NOTE — ROUTINE PROCESS
Pt IV removed. Pt and family given discharge papers and prescriptions sent electronically to pharmacy. No questions at this time.

## 2021-04-26 ENCOUNTER — VIRTUAL VISIT (OUTPATIENT)
Dept: FAMILY MEDICINE CLINIC | Age: 41
End: 2021-04-26
Payer: COMMERCIAL

## 2021-04-26 DIAGNOSIS — I26.02 ACUTE SADDLE PULMONARY EMBOLISM WITH ACUTE COR PULMONALE (HCC): ICD-10-CM

## 2021-04-26 DIAGNOSIS — I82.4Y9 DEEP VEIN THROMBOSIS (DVT) OF PROXIMAL LOWER EXTREMITY, UNSPECIFIED CHRONICITY, UNSPECIFIED LATERALITY (HCC): ICD-10-CM

## 2021-04-26 DIAGNOSIS — Z09 HOSPITAL DISCHARGE FOLLOW-UP: Primary | ICD-10-CM

## 2021-04-26 PROCEDURE — 99214 OFFICE O/P EST MOD 30 MIN: CPT | Performed by: FAMILY MEDICINE

## 2021-04-26 PROCEDURE — 1111F DSCHRG MED/CURRENT MED MERGE: CPT | Performed by: FAMILY MEDICINE

## 2021-04-26 RX ORDER — HYDROCHLOROTHIAZIDE 12.5 MG/1
12.5 TABLET ORAL DAILY
Qty: 30 TAB | Refills: 1 | Status: SHIPPED | OUTPATIENT
Start: 2021-04-26 | End: 2021-05-18 | Stop reason: DRUGHIGH

## 2021-04-26 NOTE — PROGRESS NOTES
Transitional Care Management Progress Note    Patient: Sapna Covarrubias  : 1980  PCP: Sandra Paredes MD    Date of office visit: 2021   Date of admission: 21  Date of discharge: 21  Hospital: [facility]    Call initiated w/i 2 business dates of discharge: *No response documented in the last 14 days   Date of the most recent call to the patient: *No documented post hospital discharge outreach found in the last 14 days      Assessment/Plan:   Diagnoses and all orders for this visit:    1. Hospital discharge follow-up  -     DE DISCHARGE MEDS RECONCILED W/ CURRENT OUTPATIENT MED LIST    2. Acute saddle pulmonary embolism with acute cor pulmonale (HCC)  -     rivaroxaban (XARELTO) 20 mg tab tablet; Take 1 Tab by mouth daily (with breakfast) for 30 days. Indications: blood clot in a deep vein of the extremities, a clot in the lung  -     REFERRAL TO HEMATOLOGY    3. Deep vein thrombosis (DVT) of proximal lower extremity, unspecified chronicity, unspecified laterality (HCC)  -     rivaroxaban (XARELTO) 20 mg tab tablet; Take 1 Tab by mouth daily (with breakfast) for 30 days. Indications: blood clot in a deep vein of the extremities, a clot in the lung  -     REFERRAL TO HEMATOLOGY    Other orders  -     hydroCHLOROthiazide (HYDRODIURIL) 12.5 mg tablet; Take 1 Tab by mouth daily.     OFF OCP  Soft BP in hospital--HCTZ-lisinopril being held   Swelling is worsening however, restart just hctz component to see how this does for patient  xarelto (with food) erx for 3 m, gave patient info on patient assistance  Take the 15 bid x 21 d then 20 every day thereafter  cw toprol xl for now as well  F/u hematology in July to ensure this is nothing other than provoked DVT as we suspect it is   off oxygen at home feeling ok she reports    Time spent on date of encounter reviewing chart, caring for patient, documenting and coordinating care exceeded 45 minutes    I did telephone to [facility] to follow up with the provider, Annmarie Dandy PA, about the patient as I could see in the chart he was trying to reach her and gave her an update on her condition. He was appreciative of the outreach. Subjective:   Joleen Oneil is a 47 y.o. female presenting today for follow-up after hospital discharge. This encounter and supporting documentation was reviewed if available. Medication reconciliation was performed today. The main problem requiring admission was Leg pain/ swelling and sob (found to have DVT with PE and cor pulmonale). Complications during admission: none      Interval history/Current status: breathing better at home right now    Admitting symptoms have: significantly improved    Macrocytosis without anemia--was drinking 2-3 liquor drinks / day    Leg swelling--worse while holding lisinopril hctz, both legs but L is asymmetrical related to right  Medications marked \"taking\" at this time:  Prior to Admission medications    Medication Sig Start Date End Date Taking? Authorizing Provider   rivaroxaban (XARELTO) 20 mg tab tablet Take 1 Tab by mouth daily (with breakfast) for 30 days. Indications: blood clot in a deep vein of the extremities, a clot in the lung 6/10/21 7/10/21 Yes Tapan Valle MD   rivaroxaban (XARELTO) 15 mg tab tablet Take 1 Tab by mouth two (2) times daily (with meals) for 21 days. Indications: blood clot in a deep vein of the extremities, a clot in the lung 4/22/21 5/13/21 Yes Som Tate MD   omeprazole (PRILOSEC) 40 mg capsule Take 1 Cap by mouth daily. 4/1/21  Yes Tapan Valle MD   metoprolol succinate (TOPROL-XL) 200 mg XL tablet TAKE 1 TABLET BY MOUTH EVERY DAY 2/15/21  Yes Tapan Valle MD   rivaroxaban (XARELTO) 20 mg tab tablet Take 1 Tab by mouth daily (with breakfast) for 30 days.  Indications: blood clot in a deep vein of the extremities, a clot in the lung 4/22/21 4/26/21  Som Tate MD        ROS:  A 12 point review of systems was negative except as noted here or in the HPI. 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 Indications: blood clot in a deep vein of the extremities, a clot in the lung 30 Tab 1    rivaroxaban (XARELTO) 15 mg tab tablet Take 1 Tab by mouth two (2) times daily (with meals) for 21 days. Indications: blood clot in a deep vein of the extremities, a clot in the lung 42 Tab 0    omeprazole (PRILOSEC) 40 mg capsule Take 1 Cap by mouth daily.  90 Cap 0    metoprolol succinate (TOPROL-XL) 200 mg XL tablet TAKE 1 TABLET BY MOUTH EVERY DAY 30 Tab 3     Allergies   Allergen Reactions    Norvasc [Amlodipine] Swelling    Pcn [Penicillins] Hives     Past Medical History:   Diagnosis Date    Abnormal Pap smear of cervix 12/2020    LGSIL/+HPV    Benign essential HTN 7/10/2019    Dysmenorrhea 8/19/2016    Edema of both legs 7/31/2014    Hypertension      Past Surgical History:   Procedure Laterality Date    HX HEENT      tonsillectomy     Family History   Problem Relation Age of Onset    Hypertension Mother      Social History     Tobacco Use    Smoking status: Never Smoker    Smokeless tobacco: Never Used   Substance Use Topics    Alcohol use: Yes          Objective:     Visit Vitals  LMP 04/17/2021 (Approximate)      Patient-Reported Vitals 4/26/2021   Patient-Reported Weight 186.8lb   Patient-Reported Height -   Patient-Reported Pulse 96   Patient-Reported Temperature -   Patient-Reported SpO2 96   Patient-Reported Systolic  -   Patient-Reported Diastolic -   Patient-Reported LMP 04/15/2021        Physical Exam  Vitals signs and nursing note reviewed. Constitutional:       General: She is not in acute distress. Appearance: Normal appearance. She is not ill-appearing, toxic-appearing or diaphoretic. HENT:      Head: Normocephalic and atraumatic. Right Ear: External ear normal.      Left Ear: External ear normal.      Mouth/Throat:      Mouth: Mucous membranes are moist.   Eyes:      General: No scleral icterus. Right eye: No discharge. Left eye: No discharge. Comments: eom grossly intact   Neck:      Comments: No visible neck masses , ROM appears normal from visual inspection  Pulmonary:      Effort: Pulmonary effort is normal. No respiratory distress. Skin:     Comments: Visible skin is without jaundice, bruising, lesion, pallor, erythema or rash except as otherwise noted   Neurological:      General: No focal deficit present. Mental Status: She is alert and oriented to person, place, and time. Psychiatric:         Mood and Affect: Mood normal.         Behavior: Behavior normal.         Thought Content: Thought content normal.         Judgment: Judgment normal.                  We discussed the expected course, resolution and complications of the diagnosis(es) in detail. Medication risks, benefits, costs, interactions, and alternatives were discussed as indicated. I advised her to contact the office if her condition worsens, changes or fails to improve as anticipated. She expressed understanding with the diagnosis(es) and plan.      Gosia Carrington MD

## 2021-04-26 NOTE — LETTER
NOTIFICATION RETURN TO WORK / SCHOOL    4/26/2021 9:03 AM    Ms. Joleen Oneil  [address]  [facility] [phone]      To Whom It May Concern:    Joleen Oneil is currently under the care of [address] [place] [address]. She will return to work/school on: 5/3/21  She had a non-infection related hospital admission. If there are questions or concerns please have the patient contact our office.         Sincerely,      Dana Weller MD

## 2021-04-26 NOTE — PROGRESS NOTES
Chief Complaint   Patient presents with   [facility] Follow Up     1. Have you been to the ER, urgent care clinic since your last visit? Hospitalized since your last visit? Yes, 04/21/2021 [facility], Left leg DVT, PE left lung. 2. Have you seen or consulted any other health care providers outside of the [address] [place] [address] since your last visit? Include any pap smears or colon screening.  No

## 2021-05-17 ENCOUNTER — PATIENT MESSAGE (OUTPATIENT)
Dept: FAMILY MEDICINE CLINIC | Age: 41
End: 2021-05-17

## 2021-05-17 DIAGNOSIS — I26.02 ACUTE SADDLE PULMONARY EMBOLISM WITH ACUTE COR PULMONALE (HCC): ICD-10-CM

## 2021-05-17 DIAGNOSIS — I82.4Y9 DEEP VEIN THROMBOSIS (DVT) OF PROXIMAL LOWER EXTREMITY, UNSPECIFIED CHRONICITY, UNSPECIFIED LATERALITY (HCC): ICD-10-CM

## 2021-05-17 RX ORDER — LISINOPRIL AND HYDROCHLOROTHIAZIDE 10; 12.5 MG/1; MG/1
1 TABLET ORAL DAILY
Qty: 90 TAB | Refills: 1 | Status: SHIPPED | OUTPATIENT
Start: 2021-05-17 | End: 2021-05-18

## 2021-05-18 RX ORDER — LISINOPRIL AND HYDROCHLOROTHIAZIDE 10; 12.5 MG/1; MG/1
TABLET ORAL
Qty: 90 TAB | Refills: 1 | Status: SHIPPED | OUTPATIENT
Start: 2021-05-18 | End: 2021-10-25 | Stop reason: SDUPTHER

## 2021-06-10 ENCOUNTER — TELEPHONE (OUTPATIENT)
Dept: ONCOLOGY | Age: 41
End: 2021-06-10

## 2021-06-11 ENCOUNTER — OFFICE VISIT (OUTPATIENT)
Dept: ONCOLOGY | Age: 41
End: 2021-06-11
Payer: COMMERCIAL

## 2021-06-11 VITALS
HEART RATE: 90 BPM | RESPIRATION RATE: 16 BRPM | TEMPERATURE: 97.2 F | WEIGHT: 189.8 LBS | BODY MASS INDEX: 35.83 KG/M2 | HEIGHT: 61 IN | DIASTOLIC BLOOD PRESSURE: 78 MMHG | SYSTOLIC BLOOD PRESSURE: 111 MMHG | OXYGEN SATURATION: 97 %

## 2021-06-11 DIAGNOSIS — I26.02 ACUTE SADDLE PULMONARY EMBOLISM WITH ACUTE COR PULMONALE (HCC): Primary | ICD-10-CM

## 2021-06-11 DIAGNOSIS — I26.02 ACUTE SADDLE PULMONARY EMBOLISM WITH ACUTE COR PULMONALE (HCC): ICD-10-CM

## 2021-06-11 DIAGNOSIS — I10 BENIGN ESSENTIAL HTN: ICD-10-CM

## 2021-06-11 DIAGNOSIS — I82.4Y9 DEEP VEIN THROMBOSIS (DVT) OF PROXIMAL LOWER EXTREMITY, UNSPECIFIED CHRONICITY, UNSPECIFIED LATERALITY (HCC): ICD-10-CM

## 2021-06-11 DIAGNOSIS — I82.492 ACUTE DEEP VEIN THROMBOSIS (DVT) OF OTHER SPECIFIED VEIN OF LEFT LOWER EXTREMITY (HCC): ICD-10-CM

## 2021-06-11 DIAGNOSIS — D68.59 HYPERCOAGULABLE STATE (HCC): ICD-10-CM

## 2021-06-11 DIAGNOSIS — Z79.899 HIGH RISK MEDICATION USE: ICD-10-CM

## 2021-06-11 DIAGNOSIS — E66.09 CLASS 2 OBESITY DUE TO EXCESS CALORIES WITHOUT SERIOUS COMORBIDITY WITH BODY MASS INDEX (BMI) OF 35.0 TO 35.9 IN ADULT: ICD-10-CM

## 2021-06-11 PROCEDURE — 99245 OFF/OP CONSLTJ NEW/EST HI 55: CPT | Performed by: INTERNAL MEDICINE

## 2021-06-11 RX ORDER — METOPROLOL SUCCINATE 200 MG/1
TABLET, EXTENDED RELEASE ORAL
Qty: 30 TABLET | Refills: 0 | Status: SHIPPED | OUTPATIENT
Start: 2021-06-11 | End: 2021-07-06

## 2021-06-11 NOTE — LETTER
6/11/2021 Patient: Jenny Pritchett YOB: 1980 Date of Visit: 6/11/2021 Gosia Carrington MD 
[facility] [address] [place] [id number] [address] [employer] Dear Gosia Carrington MD, Thank you for referring Ms. Shiv Pendleton to [facility] for evaluation. My notes for this consultation are attached. If you have questions, please do not hesitate to call me. I look forward to following your patient along with you.  
 
 
Sincerely, 
 
Lucero Justice MD

## 2021-06-11 NOTE — PATIENT INSTRUCTIONS
It was a pleasure to meet you. Today, we discussed your history of pulmonary embolism. We discussed that this is a blood clot in the lungs. Fortunately you have improved. However, this was a serious event. For now, we discussed further evaluating other potential contributing causes to why you form these blood clots. We also discussed the possibility that you may need to continue anticoagulation with the blood thinner medication Xarelto indefinitely. We discussed today about ordering a hypercoagulable panel. We will do this in part since some of the tests may be affected by your recent clot. Please go to the lab today. I will have you return to see me in July so that we can further discuss these results and chart a care course moving forward. Please call if you have any questions about your visit today. Please follow-up with your primary care provider regarding continued prescription of your Xarelto as well as your care regarding supplemental oxygen and any further need for pulmonary or cardiology evaluation.

## 2021-06-11 NOTE — TELEPHONE ENCOUNTER
From: Joleen Oneil  To: Dana Weller MD  Sent: 5/17/2021 1:27 PM EDT  Subject: Prescription Question    Dr. Ebony Schafer,    Please extend my prescription of Lisinopril. I ran out on Sunday and [facility] requires your permission.     Thank you,    Gaetana Babinski

## 2021-06-11 NOTE — PROGRESS NOTES
Chief Complaint   Patient presents with    New Patient     Tejinder Cain is a pleasant 47year old male who presents as a new patient for PE.  She denies pain

## 2021-06-11 NOTE — PROGRESS NOTES
[facility] at [address], [address] [facility] 1007 [facility]  W: [phone]  F: [phone] Patient ID  Name: Tiffanie Schilling  YOB: 1980  MRN: [id number]  Referring Provider:   Long Strickland [address] ,  [address] [facility]  Primary Care Provider:   Kelly Flowers MD       NEW HEMATOLOGY PATIENT VISIT     Date of Visit: 6/11/21  Reason for Visit:   DVT, Saddle Pulmonary Emboli, Anticoagulation Management, Possible Hypercoagulable Work-Up  Chief Complaint   Patient presents with    New Patient     Justina Dill is a pleasant 47year old male who presents as a new patient for PE. She denies pain     History of Present Illness:   Tiffanie Schilling is a 47 y.o. F who presents as a referral for evaluation for a history of  pulmonary emboli. She was diagnosed in April 2021. She notes starting oral contraceptive pills about 3 months prior to her VTE event. She states that she had tolerated OCP's for years in the past. She states that her symptoms onset was acute and rapid. She notes that she had some difficulty walking. She states that she was dragging her left leg due to swelling. She went first to [facility] for the leg swelling. Her symptoms continued and rapidly developed worsening left leg pain. She sought care at [address] [place] [address] and was transported to [address] [place] [address]. She tells me that she had in the past been prescribed a diuretic for hypertension issues over the past 3 years. She states that she also has had chronic left leg swelling for about 5 years. She denies any prior known venous thromboemboli. She denies any storke history. She reports that she was never with shortness of breath. 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 She denies any oxygen use at home. She does note checking her pulse oximetry.  She received her first COVID vaccination after her hospitalization for DVT and Pulmonary Emboli. New Patient  The history is provided by the patient. This is a new problem. The current episode started more than 1 week ago. The problem occurs constantly. The problem has been gradually improving. Pertinent negatives include no chest pain, no abdominal pain, no headaches and no shortness of breath. Nothing aggravates the symptoms. The symptoms are relieved by medications (Xarelto has improved her swelling over the inital days from her clot events. ). Treatments tried: Reports prescribed xarelto. The treatment provided significant relief. Past Medical History:   Diagnosis Date    Abnormal Pap smear of cervix 12/2020    LGSIL/+HPV    Benign essential HTN 7/10/2019    Dysmenorrhea 8/19/2016    Edema of both legs 7/31/2014    Hypertension       Past Surgical History:   Procedure Laterality Date    HX HEENT      tonsillectomy      Social History     Tobacco Use    Smoking status: Never Smoker    Smokeless tobacco: Never Used   Substance Use Topics    Alcohol use: Yes      Family History   Problem Relation Age of Onset    Hypertension Mother      Current Outpatient Medications   Medication Sig    lisinopril-hydroCHLOROthiazide (PRINZIDE, ZESTORETIC) 10-12.5 mg per tablet TAKE 1 TABLET BY MOUTH EVERY DAY    rivaroxaban (XARELTO) 20 mg tab tablet Take 1 Tab by mouth daily (with breakfast) for 30 days. Indications: blood clot in a deep vein of the extremities, a clot in the lung    omeprazole (PRILOSEC) 40 mg capsule Take 1 Cap by mouth daily.  metoprolol succinate (TOPROL-XL) 200 mg XL tablet TAKE 1 TABLET BY MOUTH EVERY DAY     No current facility-administered medications for this visit. Allergies   Allergen Reactions    Norvasc [Amlodipine] Swelling    Pcn [Penicillins] Hives      Review of Systems:   Review of Systems   Constitutional: Negative for fever and malaise/fatigue. HENT: Negative for nosebleeds.     Respiratory: Negative for cough, hemoptysis and shortness of breath. Cardiovascular: Negative for chest pain, claudication and leg swelling. Gastrointestinal: Negative for abdominal pain, blood in stool, constipation, diarrhea and melena. Genitourinary: Negative for dysuria and hematuria. Notes Heavy Periods while on xarelto but no uncontrolled   Musculoskeletal: Negative for joint pain and myalgias. Skin: Negative for itching and rash. Neurological: Negative for seizures and headaches. Psychiatric/Behavioral: Negative for depression. The patient is not nervous/anxious. Physical Exam:     Visit Vitals  /78   Pulse 90   Temp 97.2 °F (36.2 °C)   Resp 16   Ht 5' 1\" (1.549 m)   Wt 189 lb 12.8 oz (86.1 kg)   SpO2 97%   BMI 35.86 kg/m²     Physical Exam  Constitutional:       General: She is not in acute distress. Appearance: She is not ill-appearing, toxic-appearing or diaphoretic. HENT:      Nose: No congestion or rhinorrhea. Mouth/Throat:      Mouth: Mucous membranes are moist.      Pharynx: Oropharynx is clear. No oropharyngeal exudate. Eyes:      General: No scleral icterus. Conjunctiva/sclera: Conjunctivae normal.   Cardiovascular:      Pulses: Normal pulses. Heart sounds: No murmur heard. Pulmonary:      Effort: Pulmonary effort is normal. No respiratory distress. Breath sounds: No stridor. No wheezing, rhonchi or rales. Abdominal:      General: Bowel sounds are normal.      Palpations: Abdomen is soft. Tenderness: There is no abdominal tenderness. There is no guarding. Musculoskeletal:         General: No tenderness (no pain on palpation of lower legs) or deformity. Right lower leg: No edema. Left lower leg: No edema. Lymphadenopathy:      Cervical: No cervical adenopathy. Upper Body:      Right upper body: No supraclavicular adenopathy. Left upper body: No supraclavicular adenopathy. Skin:     Findings: No bruising, erythema or rash.    Neurological: General: No focal deficit present. Mental Status: She is alert and oriented to person, place, and time. Gait: Gait normal.   Psychiatric:         Mood and Affect: Mood normal.         Behavior: Behavior normal.         Thought Content: Thought content normal.       Results:     Lab Results   Component Value Date/Time    WBC 9.1 04/23/2021 03:20 AM    HGB 12.7 04/23/2021 03:20 AM    HCT 36.6 04/23/2021 03:20 AM    PLATELET 586 91/52/5810 03:20 AM    .6 (H) 04/23/2021 03:20 AM    ABS. NEUTROPHILS 5.9 04/23/2021 03:20 AM     Lab Results   Component Value Date/Time    Sodium 135 (L) 04/23/2021 03:20 AM    Potassium 3.7 04/23/2021 03:20 AM    Chloride 102 04/23/2021 03:20 AM    CO2 28 04/23/2021 03:20 AM    Glucose 93 04/23/2021 03:20 AM    BUN 6 04/23/2021 03:20 AM    Creatinine 0.57 04/23/2021 03:20 AM    GFR est AA >60 04/23/2021 03:20 AM    GFR est non-AA >60 04/23/2021 03:20 AM    Calcium 9.3 04/23/2021 03:20 AM     Lab Results   Component Value Date/Time    Bilirubin, total 0.7 04/21/2021 03:54 PM    ALT (SGPT) 21 04/21/2021 03:54 PM    Alk. phosphatase 49 04/21/2021 03:54 PM    Protein, total 8.1 04/21/2021 03:54 PM    Albumin 3.5 04/21/2021 03:54 PM    Globulin 4.6 (H) 04/21/2021 03:54 PM     CTA CHEST W OR W WO CONT    Result Date: 4/21/2021    Extensive bilateral pulmonary emboli. Saddle embolus. Right heart strain Geographic low density within the left hepatic lobe. May represent focal fatty infiltration. Infarct is felt to be less likely. This could be evaluated with nonemergent MR imaging The findings were called to Dr. Modesta Nissen on 4/21/2021 at [phone] by myself. [phone]     XR KNEE LT 3 V    Result Date: 4/21/2021  EXAM: XR KNEE LT 3 V INDICATION: Twisted knee. COMPARISON: None. FINDINGS: Three views of the left knee demonstrate no fracture or other acute osseous or articular abnormality. There is no effusion. No acute abnormality.     ECHO ADULT COMPLETE    Result Date: 4/22/2021  · LV: Estimated LVEF is 60 - 65%. Normal cavity size and systolic function (ejection fraction normal). Mild concentric hypertrophy. Wall motion: normal.      DUPLEX LOWER EXT VENOUS LEFT    Result Date: 4/21/2021  · Acute thrombus present in the left common femoral vein. · Acute thrombus present in the left proximal femoral vein. · Acute thrombus present in the left mid femoral vein. · Acute thrombus present in the left distal femoral vein. · Acute thrombus present in the left popliteal vein. Assessment and Recommendations:   1. Acute saddle pulmonary embolism with acute cor pulmonale [facility])         29-year-old female with new onset bilateral PE and DVT in the setting of oral contraceptive use. Today, I discussed with Mari Rabago the 2 main points of her care. We discussed the first goal is to establish if she had a provoked clot or an unprovoked clot. We discussed that while taking oral contraceptives is a weak trigger for VTE formation (See: Marychuy CHILDRESS (2017). Thrombophilia Testing and Venous Thrombosis. The [place] journal of medicine, V284425), [id number]. PMID: [id number]) it could be the culprit. However, patient tells me that she has been on oral contraceptives in the past without any prior clot history. We discussed the possibility of other risk factors contributing to clot formation which would include obesity. She does not relate a smoking history and denies any family history of venous thromboembolism formation. However, due to the significance and acute onset of her pulmonary embolism we discussed the possibility of her requiring indefinite anticoagulation. While her initial CT scan suggested right heart strain, her ECHO on 4/22 was unrevealing for any drop in LVEF. We discussed that even though we may attain a D-dimer and a repeat ultrasound that these tests may not influence our decision on management.   Since she had taken oral contraceptives in the past and had no problems she is curious as to other potential causes of her saddle PE. I have recommended that she continue for now indefinite anticoagulation with Xarelto although we will have her return to our clinic next month to further discuss charting her long-term course. The other interesting aspect is the velocity of her clot burden formation as she did not have any pulmonary complaints per her report. She noted about a 2 to 3-day window of leg pain as her only symptom prior to her presenting for emergency care and hospitalization. 2. Acute deep vein thrombosis (DVT) of other specified vein of left lower extremity ([address]  Discussed with patient that at the completion of 3 months a repeat ultrasound and D-dimer may give us more information. However, I discussed with her that she may still have ongoing risk factors including obesity and some undetermined factor that could put her at future risk of clot formation. Therefore, I recommended that we continue with indefinite anticoagulation at this time. We discussed the implications of indefinite anticoagulation which can include bleeding that rarely can be life-threatening. We also discussed that she is at risk for potential fatal venous thromboemboli while off anticoagulation as well. She seems to tolerate Xarelto well and has no further physical complaints especially with regard to her left leg pain as her presenting symptom. 3. Hypercoagulable state ([address]  -     LUPUS ANTICOAGULANT PANEL W/ REFLEX; Future  -     CARDIOLIPIN AB PANEL; Future  -     BETA-2 GLYCOPROTEIN I ABS; Future  -     FACTOR V LEIDEN; Future  -     ANTITHROMBIN III ACTIVITY; Future  -     FACTOR II  DNA ANALYSIS; Future  Today, I discussed with Barak Chinchilla that we can obtain part of the hypercoagulable work-up such as acquired and inherited testing. Labs as noted above. Patient understood to go to lab today. I will hold off on Protein S and Protein C testing.  Technically, Antithrombin III activity could be influenced by her clot event. However, if it is low then we will have to simply repeat it after the 3 month window. She also understood that there aren't clear guidelines on completing Hypercoagulable Work-Up; I have defaulted to patient being on oral contraceptives as a weak trigger and not being able to absolutely classifying her VTE event as Provoked from OCP's. She is interested in pursuing testing. 4. Class 2 obesity due to excess calories without serious comorbidity with body mass index (BMI) of 35.0 to 35.9 in adult  Patient will continue to follow with her primary care provider to discuss diet/behavioral modification for goals of weight loss. She understood that her obese state could contribute to venous embolism formation. 5. High risk medication use  Patient will continue on Xarelto. She is without any clear known side effects that she communicates to me. She should have her CBC and complete metabolic panel monitored at least every 6 to 12 months while on this medication. She is working with her primary care provider to extend her Xarelto prescription. Patient understood my recommendation to continue this indefinitely. Follow-up and Dispositions  ·   Return in about 5 weeks (around 7/19/2021). AFTER VISIT SUMMARY RECOMMENDATIONS:  It was a pleasure to meet you. Today, we discussed your history of pulmonary embolism. We discussed that this is a blood clot in the lungs. Fortunately you have improved. However, this was a serious event. For now, we discussed further evaluating other potential contributing causes to why you form these blood clots. We also discussed the possibility that you may need to continue anticoagulation with the blood thinner medication Xarelto indefinitely. We discussed today about ordering a hypercoagulable panel. We will do this in part since some of the tests may be affected by your recent clot. Please go to the lab today.   I will have you return to see me in July so that we can further discuss these results and chart a care course moving forward. Please call if you have any questions about your visit today. Please follow-up with your primary care provider regarding continued prescription of your Xarelto as well as your care regarding supplemental oxygen and any further need for pulmonary or cardiology evaluation. SPECIAL NOTE:  Her CT scan mentioned an abnormality in her liver. I will defer further work-up to her primary care provider. It was noted that patient may need MRI imaging of the liver. I sent Ms. Angela Silva a [employer] and will send to her primary care provider.       Signed By:   Trice Hurst MD

## 2021-06-27 DIAGNOSIS — R14.2 BELCHING: ICD-10-CM

## 2021-06-27 DIAGNOSIS — K21.9 GASTROESOPHAGEAL REFLUX DISEASE WITHOUT ESOPHAGITIS: ICD-10-CM

## 2021-06-28 RX ORDER — OMEPRAZOLE 40 MG/1
CAPSULE, DELAYED RELEASE ORAL
Qty: 90 CAPSULE | Refills: 0 | Status: SHIPPED | OUTPATIENT
Start: 2021-06-28 | End: 2021-10-08

## 2021-07-04 DIAGNOSIS — I10 BENIGN ESSENTIAL HTN: ICD-10-CM

## 2021-07-06 RX ORDER — METOPROLOL SUCCINATE 200 MG/1
TABLET, EXTENDED RELEASE ORAL
Qty: 30 TABLET | Refills: 0 | Status: SHIPPED | OUTPATIENT
Start: 2021-07-06 | End: 2021-08-05

## 2021-07-16 ENCOUNTER — OFFICE VISIT (OUTPATIENT)
Dept: ONCOLOGY | Age: 41
End: 2021-07-16
Payer: COMMERCIAL

## 2021-07-16 VITALS
TEMPERATURE: 97.5 F | SYSTOLIC BLOOD PRESSURE: 105 MMHG | HEIGHT: 61 IN | HEART RATE: 85 BPM | DIASTOLIC BLOOD PRESSURE: 73 MMHG | OXYGEN SATURATION: 98 % | RESPIRATION RATE: 16 BRPM | BODY MASS INDEX: 35.72 KG/M2 | WEIGHT: 189.2 LBS

## 2021-07-16 DIAGNOSIS — I82.492 ACUTE DEEP VEIN THROMBOSIS (DVT) OF OTHER SPECIFIED VEIN OF LEFT LOWER EXTREMITY (HCC): ICD-10-CM

## 2021-07-16 DIAGNOSIS — I26.02 ACUTE SADDLE PULMONARY EMBOLISM WITH ACUTE COR PULMONALE (HCC): Primary | ICD-10-CM

## 2021-07-16 DIAGNOSIS — D68.59 HYPERCOAGULABLE STATE (HCC): ICD-10-CM

## 2021-07-16 DIAGNOSIS — Z79.899 HIGH RISK MEDICATION USE: ICD-10-CM

## 2021-07-16 PROCEDURE — 99213 OFFICE O/P EST LOW 20 MIN: CPT | Performed by: INTERNAL MEDICINE

## 2021-07-16 NOTE — PROGRESS NOTES
[facility] at [address] [facility]: [phone]  F: [phone] Patient ID  Name: Caio Dailey  YOB: 1980  MRN: [id number]  Referring Provider:   No referring provider defined for this encounter. Primary Care Provider:   Thalia Bauman MD       HEMATOLOGY/MEDICAL ONCOLOGY NOTE     Date of Visit: 07/16/21  Reason for Visit:     Chief Complaint   Patient presents with    Other     hypercoagulation    Labs    Blood Clot     f/u     Subjective:   Caio Dailey is a 47 y.o. F who presents for a follow-up evaluation for Hypercoagulable State. Patient reports that he is doing well overall. She reports adequate oral intake of food and hydration. Patient denies any bowel or bladder problems. Denies any bleeding issues. She reports remaining active. Still working. Reportedly may have a gynecology procedure upcoming. Past Medical History:   Diagnosis Date    Abnormal Pap smear of cervix 12/2020    LGSIL/+HPV    Benign essential HTN 7/10/2019    Dysmenorrhea 8/19/2016    Edema of both legs 7/31/2014    Hypertension       Past Surgical History:   Procedure Laterality Date    HX HEENT      tonsillectomy      Social History     Tobacco Use    Smoking status: Never Smoker    Smokeless tobacco: Never Used   Substance Use Topics    Alcohol use: Yes      Family History   Problem Relation Age of Onset    Hypertension Mother     Clotting Disorder Neg Hx         No known VTE     Current Outpatient Medications   Medication Sig    metoprolol succinate (TOPROL-XL) 200 mg XL tablet TAKE 1 TABLET BY MOUTH EVERY DAY    lisinopril-hydroCHLOROthiazide (PRINZIDE, ZESTORETIC) 10-12.5 mg per tablet TAKE 1 TABLET BY MOUTH EVERY DAY    omeprazole (PRILOSEC) 40 mg capsule TAKE 1 CAPSULE BY MOUTH EVERY DAY (Patient not taking: Reported on 7/16/2021)     No current facility-administered medications for this visit.       Allergies Allergen Reactions    Norvasc [Amlodipine] Swelling    Pcn [Penicillins] Hives      Review of Systems   Constitutional: Negative for fever and malaise/fatigue. HENT: Negative for nosebleeds and sore throat. Eyes: Negative for blurred vision and pain. Respiratory: Negative for hemoptysis and shortness of breath. Cardiovascular: Negative for chest pain and syncope. Reports chronic leg swelling/bigger appearance of lower left leg. Gastrointestinal: Negative for abdominal pain, blood in stool, constipation, diarrhea, melena, nausea and vomiting. Genitourinary: Negative for dysuria and hematuria. Musculoskeletal: Negative for joint pain and myalgias. Skin: Negative for itching and rash. Neurological: Negative for seizures and headaches. Psychiatric/Behavioral: Negative for depression. The patient is not nervous/anxious. Objective:     Visit Vitals  /73 (BP 1 Location: Left arm, BP Patient Position: Sitting, BP Cuff Size: Small adult)   Pulse 85   Temp 97.5 °F (36.4 °C) (Oral)   Resp 16   Ht 5' 1\" (1.549 m)   Wt 189 lb 3.2 oz (85.8 kg)   LMP 06/20/2021   SpO2 98%   BMI 35.75 kg/m²        Physical Exam  Constitutional:  No acute distress. Non-toxic appearance. Non-diaphoretic. HENT: Normocephalic and atraumatic head. Eyes: No scleral icterus. Conjunctivae normal.   Cardiovascular: Heart sounds: Normal heart sounds. No friction rub. No gallop. Trace edema in left leg. Pulmonary: Effort: Pulmonary effort is normal. No respiratory distress. Breath sounds: No wheezing, rhonchi or rales. Abdominal:  General: Bowel sounds are normal. Palpations: Abdomen is soft. Tenderness: There is no abdominal tenderness. No guarding. Skin: Coloration: Skin is not jaundiced. Findings: No rash. NO significant erythema of lower extremities. Musculoskeletal: No muscle pain on palpation. No temporal muscle wasting on inspection. Neurological: Alert and oriented to person, place, and time.  Mental status is at baseline. Normal gait. Psychiatric:  Mood normal. Normal speech rate. Results:     Lab Results   Component Value Date/Time    WBC 9.1 04/23/2021 03:20 AM    HGB 12.7 04/23/2021 03:20 AM    HCT 36.6 04/23/2021 03:20 AM    PLATELET 280 39/62/4529 03:20 AM    .6 (H) 04/23/2021 03:20 AM    ABS. NEUTROPHILS 5.9 04/23/2021 03:20 AM     Lab Results   Component Value Date/Time    Sodium 135 (L) 04/23/2021 03:20 AM    Potassium 3.7 04/23/2021 03:20 AM    Chloride 102 04/23/2021 03:20 AM    CO2 28 04/23/2021 03:20 AM    Glucose 93 04/23/2021 03:20 AM    BUN 6 04/23/2021 03:20 AM    Creatinine 0.57 04/23/2021 03:20 AM    GFR est AA >60 04/23/2021 03:20 AM    GFR est non-AA >60 04/23/2021 03:20 AM    Calcium 9.3 04/23/2021 03:20 AM     Lab Results   Component Value Date/Time    Bilirubin, total 0.7 04/21/2021 03:54 PM    ALT (SGPT) 21 04/21/2021 03:54 PM    Alk. phosphatase 49 04/21/2021 03:54 PM    Protein, total 8.1 04/21/2021 03:54 PM    Albumin 3.5 04/21/2021 03:54 PM    Globulin 4.6 (H) 04/21/2021 03:54 PM       CTA CHEST W OR W WO CONT    Result Date: 4/21/2021  INDICATION: Acute DVT. Evaluate for PE COMPARISON: None TECHNIQUE:  2.5 mm axial images were obtained from the bases to the lung apices after the intravenous administration of 100 cc of Isovue-370. Three-dimensional postprocessing was performed by the technologist with MIP reconstructions. CT dose reduction was achieved through use of a standardized protocol tailored for this examination and automatic exposure control for dose modulation. FINDINGS: THYROID: No nodule. MEDIASTINUM: No mass or lymphadenopathy. GEOFFREY: No mass or lymphadenopathy. THORACIC AORTA: No dissection or aneurysm. MAIN PULMONARY ARTERY: Extensive bilateral pulmonary emboli. Saddle embolus TRACHEA/BRONCHI: Patent. ESOPHAGUS: No wall thickening or dilatation.  HEART: Not enlarged however the interventricular septum is deviated towards the left ventricle suggesting heart strain PLEURA: No effusion or pneumothorax. LUNGS: No nodule, mass, or airspace disease. INCIDENTALLY IMAGED UPPER ABDOMEN: Geographic low density left hepatic lobe BONES: No destructive bone lesion. Extensive bilateral pulmonary emboli. Saddle embolus. Right heart strain Geographic low density within the left hepatic lobe. May represent focal fatty infiltration. Infarct is felt to be less likely. This could be evaluated with nonemergent MR imaging The findings were called to Dr. Kalie Felder on 4/21/2021 at [phone] by myself. [phone]     XR KNEE LT 3 V    Result Date: 4/21/2021  EXAM: XR KNEE LT 3 V INDICATION: Twisted knee. COMPARISON: None. FINDINGS: Three views of the left knee demonstrate no fracture or other acute osseous or articular abnormality. There is no effusion. No acute abnormality. ECHO ADULT COMPLETE    Result Date: 4/22/2021  · LV: Estimated LVEF is 60 - 65%. Normal cavity size and systolic function (ejection fraction normal). Mild concentric hypertrophy. Wall motion: normal.      DUPLEX LOWER EXT VENOUS LEFT    Result Date: 4/21/2021  · Acute thrombus present in the left common femoral vein. · Acute thrombus present in the left proximal femoral vein. · Acute thrombus present in the left mid femoral vein. · Acute thrombus present in the left distal femoral vein. · Acute thrombus present in the left popliteal vein. Assessment and Recommendations:   Diagnoses and all orders for this visit:    1. Acute saddle pulmonary embolism with acute cor pulmonale (HCC)  · -continue to recommend indefinite anticoagulation. · -patient in agreement since she had a significant PE. 2. Acute deep vein thrombosis (DVT) of other specified vein of left lower extremity (HCC)  · -unclear if trace edema related to chronic vascular issues or if this is still resultant from clot. · Continue with anticoagulation.     3. Hypercoagulable state ([address]   Discussed with patient that she will continue with anticoagulation indefinitely. This is due to her significant clot burden and not having a clear strong provoking factor.  We discussed that after she is out of the acute window of treatment then it may be a consideration to test for protein C and protein S.    4. High risk medication use   Continue with Xarelto. She will follow-up with us in 6 months or sooner if needed.  I recommend her prescribing provider check her renal and hepatic functioning at least once every 3 6 months.  Discussed with patient that I would recommend she have her gynecologist send her note regarding surgical plan so that we can assist in the perioperative period. Follow-up and Dispositions  ·   Return in about 6 months (around 1/16/2022).        Signed By:   Vicky Boss MD

## 2021-07-16 NOTE — PROGRESS NOTES
Chief Complaint   Patient presents with    Other     hypercoagulation    Labs    Blood Clot     f/u       Visit Vitals  /73 (BP 1 Location: Left arm, BP Patient Position: Sitting, BP Cuff Size: Small adult)   Pulse 85   Temp 97.5 °F (36.4 °C) (Oral)   Resp 16   Ht 5' 1\" (1.549 m)   Wt 189 lb 3.2 oz (85.8 kg)   LMP 06/20/2021   SpO2 98%   BMI 35.75 kg/m²       1. Have you been to the ER, urgent care clinic since your last visit? Hospitalized since your last visit? No    2. Have you seen or consulted any other health care providers outside of the [address] [place] [address] since your last visit? Include any pap smears or colon screening.  No

## 2021-07-21 ENCOUNTER — VIRTUAL VISIT (OUTPATIENT)
Dept: FAMILY MEDICINE CLINIC | Age: 41
End: 2021-07-21
Payer: COMMERCIAL

## 2021-07-21 DIAGNOSIS — I10 BENIGN ESSENTIAL HTN: ICD-10-CM

## 2021-07-21 DIAGNOSIS — R93.2 ABNORMAL CT OF LIVER: Primary | ICD-10-CM

## 2021-07-21 DIAGNOSIS — Z86.711 HISTORY OF PULMONARY EMBOLISM: ICD-10-CM

## 2021-07-21 PROCEDURE — 99214 OFFICE O/P EST MOD 30 MIN: CPT | Performed by: FAMILY MEDICINE

## 2021-07-21 NOTE — PROGRESS NOTES
Matt Pollock is a 47 y.o. female who was seen by synchronous (real-time) audio-video technology on 7/21/2021. Consent: Matt Pollock, who was seen by synchronous (real-time) audio-video technology, and/or her healthcare decision maker, is aware that this patient-initiated, Telehealth encounter on 7/21/2021 is a billable service, with coverage as determined by her insurance carrier. She is aware that she may receive a bill and has provided verbal consent to proceed: Yes. Assessment & Plan:   1. Abnormal CT of liver  Reviewed with patient, will complete MRI to further characterize per recomendation  - MRI ABD W CONT; Future    2. Benign essential HTN  Stable at goal    3. History of pulmonary embolism  Per heme-recommend to stay lifelong on ac right now    Ankle swelling likely related to prior suspected LE dvt (which was pre-PE)  Recommend elevation, support leggings/socks and also compression while exercising  Exercise activates muscle pump  No signs of stasis derm  No signs of varicosities at this time per her report, will keep watch on this            Pt was counseled on risks, benefits and alternatives of treatment options. All questions were asked and answered and the patient was agreeable with the treatment plan as outlined. Subjective:   Matt Pollock is a 47 y.o. female who was seen for Labs (Results of CT)      H/o PE: Factor V + , plan for lifelong anticoagulation, off ocp    CT Results (most recent):  Results from Hospital Encounter encounter on 04/21/21    CTA CHEST W OR W WO CONT    Narrative  INDICATION: Acute DVT. Evaluate for PE    COMPARISON: None    TECHNIQUE:  2.5 mm axial images were obtained from the bases to the lung apices  after the intravenous administration of 100 cc of Isovue-370. Three-dimensional  postprocessing was performed by the technologist with MIP reconstructions.  CT  dose reduction was achieved through use of a standardized protocol tailored for  this examination and automatic exposure control for dose modulation. FINDINGS:    THYROID: No nodule. MEDIASTINUM: No mass or lymphadenopathy. GEOFFREY: No mass or lymphadenopathy. THORACIC AORTA: No dissection or aneurysm. MAIN PULMONARY ARTERY: Extensive bilateral pulmonary emboli. Saddle embolus  TRACHEA/BRONCHI: Patent. ESOPHAGUS: No wall thickening or dilatation. HEART: Not enlarged however the interventricular septum is deviated towards the  left ventricle suggesting heart strain  PLEURA: No effusion or pneumothorax. LUNGS: No nodule, mass, or airspace disease. INCIDENTALLY IMAGED UPPER ABDOMEN: Geographic low density left hepatic lobe  BONES: No destructive bone lesion. Impression  Extensive bilateral pulmonary emboli. Saddle embolus. Right heart strain    Geographic low density within the left hepatic lobe. May represent focal fatty  infiltration. Infarct is felt to be less likely. This could be evaluated with  nonemergent MR imaging    The findings were called to Dr. Catalina Hopper on 4/21/2021 at [phone] by myself. 789    Noted on ct scan abnormality of L lobe of liver, recommendation from Radiology had been non emergent MR for further characterization  Patient now has distance from event, is stable and is able to have this study performed    Medications, allergies, PMH, PSH, [facility]CH, [facility] reviewed and updated per routine protocol, see chart for review and changes if not noted here. ROS  A 12 point review of systems was negative except as noted here or in the HPI.     Objective:   Vital Signs: (As obtained by patient/caregiver at home)  Patient-Reported Vitals 7/21/2021   Patient-Reported Weight -   Patient-Reported Height -   Patient-Reported Pulse 80   Patient-Reported Temperature 98   Patient-Reported SpO2 -   Patient-Reported Systolic  529   Patient-Reported Diastolic 85   Patient-Reported LMP 07/19/2021        [INSTRUCTIONS:  \"[x]\" Indicates a positive item  \"[]\" Indicates a negative item  -- DELETE ALL ITEMS NOT EXAMINED]    Constitutional: [x] Appears well-developed and well-nourished [x] No apparent distress      [] Abnormal -     Mental status: [x] Alert and awake  [x] Oriented to person/place/time [x] Able to follow commands    [] Abnormal -     Eyes:   EOM    [x]  Normal    [] Abnormal -   Sclera  [x]  Normal    [] Abnormal -          Discharge [x]  None visible   [] Abnormal -     HENT: [x] Normocephalic, atraumatic  [] Abnormal -   [x] Mouth/Throat: Mucous membranes are moist    External Ears [x] Normal  [] Abnormal -    Neck: [x] No visualized mass [] Abnormal -     Pulmonary/Chest: [x] Respiratory effort normal   [x] No visualized signs of difficulty breathing or respiratory distress        [] Abnormal -      Musculoskeletal:   [] Normal gait with no signs of ataxia         [x] Normal range of motion of neck        [] Abnormal -     Neurological:        [x] No Facial Asymmetry (Cranial nerve 7 motor function) (limited exam due to video visit)          [x] No gaze palsy        [] Abnormal -          Skin:        [x] No significant exanthematous lesions or discoloration noted on facial skin         [] Abnormal -            Psychiatric:       [x] Normal Affect [] Abnormal -        [x] No Hallucinations    Other pertinent observable physical exam findings:well appearing, no distress, non toxic    We discussed the expected course, resolution and complications of the diagnosis(es) in detail. Medication risks, benefits, costs, interactions, and alternatives were discussed as indicated. I advised her to contact the office if her condition worsens, changes or fails to improve as anticipated. She expressed understanding with the diagnosis(es) and plan. Amada Parker is a 47 y.o. female who was evaluated by a video visit encounter for concerns as above. Patient identification was verified prior to start of the visit. A caregiver was present when appropriate.  Due to this being a TeleHealth encounter (During EXTDY-05 public health emergency), evaluation of the following organ systems was limited: Vitals/Constitutional/EENT/Resp/CV/GI//MS/Neuro/Skin/Heme-Lymph-Imm. Pursuant to the emergency declaration under the [facility] waiver authority and the [employer] Act, this Virtual  Visit was conducted, with patient's (and/or legal guardian's) consent, to reduce the patient's risk of exposure to COVID-19 and provide necessary medical care. Services were provided through a video synchronous discussion virtually to substitute for in-person clinic visit. Patient and provider were located at their individual homes. Roseann [facility] Hem[facility]  07/21/21 1:03 PM     Portions of this note may have been populated using smart dictation software and may have \"sounds-like\" errors present.

## 2021-07-21 NOTE — PROGRESS NOTES
Chief Complaint   Patient presents with   Vonmlla Darryl     Results of CT     1. Have you been to the ER, urgent care clinic since your last visit? Hospitalized since your last visit? No    2. Have you seen or consulted any other health care providers outside of the [address] [place] [address] since your last visit? Include any pap smears or colon screening.  No

## 2021-08-05 DIAGNOSIS — I10 BENIGN ESSENTIAL HTN: ICD-10-CM

## 2021-08-05 RX ORDER — METOPROLOL SUCCINATE 200 MG/1
TABLET, EXTENDED RELEASE ORAL
Qty: 30 TABLET | Refills: 0 | Status: SHIPPED | OUTPATIENT
Start: 2021-08-05 | End: 2021-09-08

## 2021-08-12 ENCOUNTER — HOSPITAL ENCOUNTER (EMERGENCY)
Age: 41
Discharge: HOME | End: 2021-08-12
Attending: EMERGENCY MEDICINE
Payer: COMMERCIAL

## 2021-08-12 ENCOUNTER — APPOINTMENT (OUTPATIENT)
Dept: VASCULAR SURGERY | Age: 41
End: 2021-08-12
Attending: NURSE PRACTITIONER
Payer: COMMERCIAL

## 2021-08-12 ENCOUNTER — DOCUMENTATION ONLY (OUTPATIENT)
Dept: ONCOLOGY | Age: 41
End: 2021-08-12

## 2021-08-12 VITALS
HEART RATE: 91 BPM | TEMPERATURE: 97.5 F | RESPIRATION RATE: 18 BRPM | SYSTOLIC BLOOD PRESSURE: 153 MMHG | DIASTOLIC BLOOD PRESSURE: 95 MMHG | OXYGEN SATURATION: 98 % | WEIGHT: 189.15 LBS | HEIGHT: 61 IN | BODY MASS INDEX: 35.71 KG/M2

## 2021-08-12 DIAGNOSIS — M79.605 PAIN OF LEFT LOWER EXTREMITY: Primary | ICD-10-CM

## 2021-08-12 DIAGNOSIS — M79.605 ACUTE LEG PAIN, LEFT: ICD-10-CM

## 2021-08-12 PROCEDURE — 93971 EXTREMITY STUDY: CPT

## 2021-08-12 PROCEDURE — 99282 EMERGENCY DEPT VISIT SF MDM: CPT

## 2021-08-12 NOTE — ED TRIAGE NOTES
Pt reports left leg pain and swelling. Hx of DVT and PE about 3 months ago with similar sx. Takes Xarelto. Denies CP or SOB.

## 2021-08-13 NOTE — ED PROVIDER NOTES
This is a 56-year-old female with past medical history of hypertension, DVT (thought to be provoked by hormonal contraception and possibly factor V Leiden) who presents the ER today for evaluation of atraumatic left lower extremity pain. Patient states that her pain started over the last several days and seems to be present during both physical exertion and while resting. She believes that the pain is consistent with her prior DVT and she feels most of the discomfort along her anterior lateral left thigh. She denies any new swelling and states that she is compliant with her prescribed Xarelto. Past Medical History:   Diagnosis Date    Abnormal Pap smear of cervix 12/2020    LGSIL/+HPV    Benign essential HTN 7/10/2019    Dysmenorrhea 8/19/2016    Edema of both legs 7/31/2014    Hypertension        Past Surgical History:   Procedure Laterality Date    HX HEENT      tonsillectomy         Family History:   Problem Relation Age of Onset    Hypertension Mother     Clotting Disorder Neg Hx         No known VTE       Social History     Socioeconomic History    Marital status: SINGLE     Spouse name: Not on file    Number of children: Not on file    Years of education: Not on file    Highest education level: Not on file   Occupational History    Not on file   Tobacco Use    Smoking status: Never Smoker    Smokeless tobacco: Never Used   Vaping Use    Vaping Use: Never used   Substance and Sexual Activity    Alcohol use: Yes    Drug use: No    Sexual activity: Yes     Partners: Male     Birth control/protection: Pill   Other Topics Concern    Not on file   Social History Narrative    Not on file     Social Determinants of Health     Financial Resource Strain:     Difficulty of Paying Living Expenses:    Food Insecurity:     Worried About Running Out of Food in the Last Year:     [address] in the Last Year:    Transportation Needs:     Lack of Transportation (Medical):      Lack of Transportation (Non-Medical):    Physical Activity:     Days of Exercise per Week:     Minutes of Exercise per Session:    Stress:     Feeling of Stress :    Social Connections:     Frequency of Communication with Friends and Family:     Frequency of Social Gatherings with Friends and Family:     Attends Denominational Services:     Active Member of Clubs or Organizations:     Attends Club or Organization Meetings:     Marital Status:    Intimate Partner Violence:     Fear of Current or Ex-Partner:     Emotionally Abused:     Physically Abused:     Sexually Abused: ALLERGIES: Norvasc [amlodipine] and Pcn [penicillins]    Review of Systems   Constitutional: Negative for fever. HENT: Negative for sore throat. Eyes: Negative for visual disturbance. Respiratory: Negative for shortness of breath. Cardiovascular: Negative for palpitations. Gastrointestinal: Negative for vomiting. Genitourinary: Negative for dysuria. Musculoskeletal: Positive for myalgias. Skin: Negative for rash. Neurological: Negative for dizziness. Psychiatric/Behavioral: Negative for dysphoric mood. Vitals:    08/12/21 1930   BP: (!) 153/95   Pulse: 91   Resp: 18   Temp: 97.5 °F (36.4 °C)   SpO2: 98%   Weight: 85.8 kg (189 lb 2.5 oz)   Height: 5' 1\" (1.549 m)            Physical Exam  Vitals and nursing note reviewed. Constitutional:       General: She is not in acute distress. Appearance: Normal appearance. She is not ill-appearing. HENT:      Head: Normocephalic. Nose: Nose normal.   Eyes:      General: No scleral icterus. Extraocular Movements: Extraocular movements intact. Cardiovascular:      Rate and Rhythm: Normal rate and regular rhythm. Pulmonary:      Effort: Pulmonary effort is normal.   Abdominal:      General: There is no distension. Tenderness: There is no guarding. Musculoskeletal:         General: Normal range of motion.       Cervical back: Normal range of motion and neck supple. Right knee: Normal. No swelling. Left knee: Normal. No swelling. Comments: Some tenderness to palpation along the musculature of left proximal calf and left distal quadricep. No limp appreciated. No skin color changes or pitting edema. Skin:     General: Skin is warm and dry. Neurological:      Mental Status: She is alert and oriented to person, place, and time. Mental status is at baseline. Psychiatric:         Mood and Affect: Mood normal.         Behavior: Behavior normal.         Thought Content: Thought content normal.         Judgment: Judgment normal.          MDM      VITAL SIGNS:  No data found. LABS:  No results found for this or any previous visit (from the past 6 hour(s)). IMAGING:  DUPLEX LOWER EXT VENOUS LEFT    (Results Pending)         Medications During Visit:  Medications - No data to display      DECISION MAKING:  Rosangela Combs is a 47 y.o. female who comes in as above. Doppler shows evidence of chronic DVT improved from prior study in April of this year. Case discussed with hematology recommended continuing prescribed Xarelto and they will see her in the office next week. The clinical decision making for this encounter included ordering and interpreting the above diagnostic tests with comparison to prior studies that are within our EMR. Past medical and surgical histories were reviewed, as were records from recent outpatient and emergency department visits. The above results discussed and reviewed with the patient. Patient verbalized understanding of the care plan, including any changes to current outpatient medication regimen, discussed disease process, symptom control, and follow-up care. Return precautions reviewed. IMPRESSION:  1. Pain of left lower extremity    2.  Acute leg pain, left        DISPOSITION:  Discharged      Discharge Medication List as of 8/12/2021  9:38 PM           Follow-up Information     Follow up With Specialties Details Why Contact Info    Vinod De La Cruz MD Family Medicine  As needed [address]  [phone]              The patient is asked to follow-up with their primary care provider in the next several days. They are to call tomorrow for an appointment. The patient is asked to return promptly for any increased concerns or worsening of symptoms. They can return to this emergency department or any other emergency department.       Procedures

## 2021-08-13 NOTE — PROGRESS NOTES
Paged by Dr. Mariama Lyn at 9:29PM. Called back; she reportedly had leg aches and underwent a repeat U/S. The U/s today per Dr. Mariama Lyn did not show any new clot or extension from her initial event earlier this year. He told me that he was discharging her home on her current anticoagulation strategy with Xarelto. I recommended that he send her discharge summary to us so that we can schedule follow-up; I recommended he have her follow-up with us next week in clinic. Bandar Serrato MD  Hematology/Medical Oncology Provider  [facility]  P: [phone]    Please schedule follow-up visit for patient.     CC:  [address] Scheduling

## 2021-08-16 ENCOUNTER — TELEPHONE (OUTPATIENT)
Dept: ONCOLOGY | Age: 41
End: 2021-08-16

## 2021-08-19 ENCOUNTER — TELEPHONE (OUTPATIENT)
Dept: ONCOLOGY | Age: 41
End: 2021-08-19

## 2021-08-30 ENCOUNTER — OFFICE VISIT (OUTPATIENT)
Dept: ONCOLOGY | Age: 41
End: 2021-08-30
Payer: COMMERCIAL

## 2021-08-30 VITALS
RESPIRATION RATE: 16 BRPM | SYSTOLIC BLOOD PRESSURE: 151 MMHG | WEIGHT: 196.8 LBS | HEIGHT: 61 IN | HEART RATE: 108 BPM | DIASTOLIC BLOOD PRESSURE: 103 MMHG | OXYGEN SATURATION: 93 % | TEMPERATURE: 97.1 F | BODY MASS INDEX: 37.16 KG/M2

## 2021-08-30 DIAGNOSIS — I10 BENIGN ESSENTIAL HTN: ICD-10-CM

## 2021-08-30 DIAGNOSIS — I26.02 ACUTE SADDLE PULMONARY EMBOLISM WITH ACUTE COR PULMONALE (HCC): Primary | ICD-10-CM

## 2021-08-30 DIAGNOSIS — D68.59 HYPERCOAGULABLE STATE (HCC): ICD-10-CM

## 2021-08-30 DIAGNOSIS — E66.09 CLASS 2 OBESITY DUE TO EXCESS CALORIES WITHOUT SERIOUS COMORBIDITY WITH BODY MASS INDEX (BMI) OF 35.0 TO 35.9 IN ADULT: ICD-10-CM

## 2021-08-30 DIAGNOSIS — I82.492 ACUTE DEEP VEIN THROMBOSIS (DVT) OF OTHER SPECIFIED VEIN OF LEFT LOWER EXTREMITY (HCC): ICD-10-CM

## 2021-08-30 PROCEDURE — 99213 OFFICE O/P EST LOW 20 MIN: CPT | Performed by: INTERNAL MEDICINE

## 2021-08-30 NOTE — PROGRESS NOTES
[facility] [address], [address]  Liz Menjivarels: [phone]  F: [phone] Patient ID  Name: Wilma Vizcarra  YOB: 1980  MRN: [id number]  Referring Provider:   No referring provider defined for this encounter. Primary Care Provider:   Sorin Jones MD       Date: 08/30/21  Reason for Visit:     Chief Complaint   Patient presents with    Follow-up     Wilma Lira is a pleasant 47year old woman who presents as a follow up. She denies pain     Subjective:   Wilma Vizcarra is a 47 y.o. F who presents for a follow-up evaluation for ER Visit for left leg pain and history of hypercoagulable state. She continues to do well on anticoagulation without any bleeding. Notes blood pressure elevated higher than usual today. She denies any headaches,vision changes. Trying to eat healthy and pursue routine active but Summer heat complicates fitness attempts. She denies any chest pain or shortness of breath. She has follow-up in primary care in October 2021. Patient denies any bowel or bladder problems.  -doing well on blood thinner.   -still has some intermittent left lower leg cramps.       Past Medical History:   Diagnosis Date    Abnormal Pap smear of cervix 12/2020    LGSIL/+HPV    Benign essential HTN 7/10/2019    Dysmenorrhea 8/19/2016    Edema of both legs 7/31/2014    Hypertension       Past Surgical History:   Procedure Laterality Date    HX HEENT      tonsillectomy      Social History     Tobacco Use    Smoking status: Never Smoker    Smokeless tobacco: Never Used   Substance Use Topics    Alcohol use: Yes      Family History   Problem Relation Age of Onset    Hypertension Mother     Clotting Disorder Neg Hx         No known VTE     Current Outpatient Medications   Medication Sig    metoprolol succinate (TOPROL-XL) 200 mg XL tablet TAKE 1 TABLET BY MOUTH EVERY DAY    rivaroxaban (Xarelto) 20 mg tab tablet Take 20 mg by mouth daily.  lisinopril-hydroCHLOROthiazide (PRINZIDE, ZESTORETIC) 10-12.5 mg per tablet TAKE 1 TABLET BY MOUTH EVERY DAY    omeprazole (PRILOSEC) 40 mg capsule TAKE 1 CAPSULE BY MOUTH EVERY DAY (Patient not taking: Reported on 7/16/2021)     No current facility-administered medications for this visit. Allergies   Allergen Reactions    Norvasc [Amlodipine] Swelling    Pcn [Penicillins] Hives        Review of Systems provided by:patient  General: denies fever and denies fatigue  Eyes: denies any vision loss and denies any eye pain  HEENT: denies epistaxis, denies trouble swallowing, and denies oral mucositis  Cardio: denies any chest pain and denies any leg swelling; has some occasoinal left lower leg pain. Resp: denies any shortness of breath and denies any hemoptysis  Abdomen: denies any abdominal pain, denies any nausea, denies any vomiting, and denies any diarrhea  MSK: denies any myalgias and denies any arthralgias  Neuro: denies any headache and denies any tremor  Psych: denies depression and denies anxiety; has some insomnia      Objective:     Visit Vitals  BP (!) 151/103   Pulse (!) 108   Temp 97.1 °F (36.2 °C)   Resp 16   Ht 5' 1\" (1.549 m)   Wt 196 lb 12.8 oz (89.3 kg)   SpO2 93%   BMI 37.19 kg/m²     ECOG PS: 0- Fully active, able to carry on all pre-disease performance without restriction. Physical Exam  Constitutional:  No acute distress. Non-toxic appearance. Non-diaphoretic. HENT: Normocephalic and atraumatic head. Wearing mask in clinic. Eyes: No scleral icterus. Conjunctivae normal.   Cardiovascular: Normal heart sounds. , Trace edema present in Left greater than Right lower leg. Pulmonary: Pulmonary effort is normal. No respiratory distress. Breath sounds: No wheezing, rhonchi or rales. Abdominal: General: Bowel sounds are normal. Palpations: Abdomen is soft. Tenderness: There is no abdominal tenderness. No guarding. Skin: Skin is not jaundiced. No rash. No petechiae. Musculoskeletal: No muscle pain on palpation. No temporal muscle wasting on inspection. Neurological: Alert and oriented to person, place, and time. Mental status is at baseline. Psychiatric: mood normal. normal speech rate and normal affect      Results:     Lab Results   Component Value Date/Time    WBC 9.1 04/23/2021 03:20 AM    HGB 12.7 04/23/2021 03:20 AM    HCT 36.6 04/23/2021 03:20 AM    PLATELET 833 86/28/5723 03:20 AM    .6 (H) 04/23/2021 03:20 AM    ABS. NEUTROPHILS 5.9 04/23/2021 03:20 AM     Lab Results   Component Value Date/Time    Sodium 135 (L) 04/23/2021 03:20 AM    Potassium 3.7 04/23/2021 03:20 AM    Chloride 102 04/23/2021 03:20 AM    CO2 28 04/23/2021 03:20 AM    Glucose 93 04/23/2021 03:20 AM    BUN 6 04/23/2021 03:20 AM    Creatinine 0.57 04/23/2021 03:20 AM    GFR est AA >60 04/23/2021 03:20 AM    GFR est non-AA >60 04/23/2021 03:20 AM    Calcium 9.3 04/23/2021 03:20 AM     Lab Results   Component Value Date/Time    Bilirubin, total 0.7 04/21/2021 03:54 PM    ALT (SGPT) 21 04/21/2021 03:54 PM    Alk. phosphatase 49 04/21/2021 03:54 PM    Protein, total 8.1 04/21/2021 03:54 PM    Albumin 3.5 04/21/2021 03:54 PM    Globulin 4.6 (H) 04/21/2021 03:54 PM       I personally reviewed referral labs: Reviewed U/S from ER visit in August 2021. Discussed with her resolution of clot in most left leg veins. Assessment and Recommendations:   Diagnoses and all orders for this visit:    1. Acute saddle pulmonary embolism with acute cor pulmonale (HCC)  Recommend continued anticoagulation since she had a pulmonary embolism without clear provoking factors. We discussed that her factor V Leiden could be a risk factor but since she was heterozygous is not clear that this could directly implicate her hypercoagulable state. We discussed that it is important for her to consider to share her diagnosis with direct relatives as they may be at risk for clotting.     2. Hypercoagulable state ([address]  We reviewed that her obese state and history of clot puts her at risk for further thrombosis and I have recommended indefinite anticoagulation with at least an annual evaluation. 3. Acute deep vein thrombosis (DVT) of other specified vein of left lower extremity (HCC)  We discussed that her follow-up ultrasound showed some resolution of her clot although she still has a persistence and even if she is not considering long-term indefinite anticoagulation that she should at least await resolution of this clot  that persists on ultrasound. 4. Class 2 obesity due to excess calories without serious comorbidity with body mass index (BMI) of 35.0 to 35.9 in adult  We discussed diet behavior management changes to improve her weight and how this may affect her high blood pressure. We discussed that she could consider try to incorporate activity on breaks at work such as walking. Also consideration of yoga. She is already doing some mindfulness activities. 5. Benign essential HTN  Patient without any acute headaches or acute vision changes. Recommend she follow-up with a primary care provider regarding blood pressure management control. Okay to cancel 1/17/2022 appt and have her follow-up in one year or sooner again if needed.     Signed By:   MD Sylvester Lazaro MD  Hematology/Medical Oncology Provider  [facility]  P: [phone]

## 2021-08-30 NOTE — PROGRESS NOTES
Chief Complaint   Patient presents with    Follow-up     Jeanmarienapoleon Alexander is a pleasant 47year old woman who presents as a follow up.  She denies pain

## 2021-08-30 NOTE — LETTER
8/31/2021    Patient: Laron Rios   YOB: 1980   Date of Visit: 8/30/2021     Neville Plunkett, [facility]  [address] [facility]  [facility]  Via In Basket    Dear Neville Plunkett MD,      Thank you for referring Ms. Lindquist [place] to [address] for evaluation. My notes for this consultation are attached. If you have questions, please do not hesitate to call me. I look forward to following your patient along with you.       Sincerely,    Oscar Sharp MD

## 2021-08-31 PROBLEM — D68.51 HETEROZYGOUS FACTOR V LEIDEN MUTATION (HCC): Status: ACTIVE | Noted: 2021-08-30

## 2021-09-04 DIAGNOSIS — I10 BENIGN ESSENTIAL HTN: ICD-10-CM

## 2021-09-08 RX ORDER — METOPROLOL SUCCINATE 200 MG/1
TABLET, EXTENDED RELEASE ORAL
Qty: 30 TABLET | Refills: 0 | Status: SHIPPED | OUTPATIENT
Start: 2021-09-08 | End: 2021-10-04

## 2021-10-04 DIAGNOSIS — I82.4Y9: ICD-10-CM

## 2021-10-04 DIAGNOSIS — I26.02 SADDLE EMBOLUS OF PULMONARY ARTERY WITH ACUTE COR PULMONALE (HCC): ICD-10-CM

## 2021-10-04 DIAGNOSIS — I10 BENIGN ESSENTIAL HTN: ICD-10-CM

## 2021-10-04 RX ORDER — RIVAROXABAN 20 MG/1
TABLET, FILM COATED ORAL
Qty: 90 TABLET | Refills: 3 | Status: SHIPPED | OUTPATIENT
Start: 2021-10-04 | End: 2022-09-14

## 2021-10-04 RX ORDER — METOPROLOL SUCCINATE 200 MG/1
TABLET, EXTENDED RELEASE ORAL
Qty: 90 TABLET | Refills: 0 | Status: SHIPPED | OUTPATIENT
Start: 2021-10-04 | End: 2022-01-04

## 2021-10-07 DIAGNOSIS — K21.9 GASTROESOPHAGEAL REFLUX DISEASE WITHOUT ESOPHAGITIS: ICD-10-CM

## 2021-10-07 DIAGNOSIS — R14.2 BELCHING: ICD-10-CM

## 2021-10-08 RX ORDER — OMEPRAZOLE 40 MG/1
CAPSULE, DELAYED RELEASE ORAL
Qty: 90 CAPSULE | Refills: 0 | Status: SHIPPED | OUTPATIENT
Start: 2021-10-08 | End: 2022-01-04

## 2021-10-25 ENCOUNTER — VIRTUAL VISIT (OUTPATIENT)
Dept: FAMILY MEDICINE CLINIC | Age: 41
End: 2021-10-25
Payer: COMMERCIAL

## 2021-10-25 DIAGNOSIS — R60.0 EDEMA OF BOTH LEGS: ICD-10-CM

## 2021-10-25 DIAGNOSIS — I10 BENIGN ESSENTIAL HTN: Primary | ICD-10-CM

## 2021-10-25 DIAGNOSIS — D68.59 HYPERCOAGULABLE STATE (HCC): ICD-10-CM

## 2021-10-25 DIAGNOSIS — D68.51 HETEROZYGOUS FACTOR V LEIDEN MUTATION (HCC): ICD-10-CM

## 2021-10-25 DIAGNOSIS — Z79.899 HIGH RISK MEDICATION USE: ICD-10-CM

## 2021-10-25 DIAGNOSIS — D75.89 MACROCYTOSIS: ICD-10-CM

## 2021-10-25 PROCEDURE — 99214 OFFICE O/P EST MOD 30 MIN: CPT | Performed by: FAMILY MEDICINE

## 2021-10-25 RX ORDER — LISINOPRIL AND HYDROCHLOROTHIAZIDE 10; 12.5 MG/1; MG/1
1 TABLET ORAL DAILY
Qty: 90 TABLET | Refills: 1 | Status: SHIPPED | OUTPATIENT
Start: 2021-10-25 | End: 2022-05-02

## 2021-10-25 NOTE — PROGRESS NOTES
Yusra Andino is a 46 y.o. female who was seen by synchronous (real-time) audio-video technology on 10/25/2021. Consent: Yusra Andino, who was seen by synchronous (real-time) audio-video technology, and/or her healthcare decision maker, is aware that this patient-initiated, Telehealth encounter on 10/25/2021 is a billable service, with coverage as determined by her insurance carrier. She is aware that she may receive a bill and has provided verbal consent to proceed: Yes. Assessment & Plan:   1. Benign essential HTN  At goal  C/w meds  Labs per orders  - CBC WITH AUTOMATED DIFF; Future  - METABOLIC PANEL, BASIC; Future  - lisinopril-hydroCHLOROthiazide (PRINZIDE, ZESTORETIC) 10-12.5 mg per tablet; Take 1 Tablet by mouth daily. Dispense: 90 Tablet; Refill: 1    2. Heterozygous factor V Leiden mutation [facility])  Ac lifelong  xarelto doing fine  No bleeding  Reviewed most recent hematology note    3. Hypercoagulable state ([address]  As above  - CBC WITH AUTOMATED DIFF; Future  - VITAMIN B12 & FOLATE; Future    4. High risk medication use  As aobve    5. Edema of both legs  C/w compression, working on weight loss  - CBC WITH AUTOMATED DIFF; Future  - METABOLIC PANEL, BASIC; Future    6. Macrocytosis  Pretty significant,w ill recheck  - CBC WITH AUTOMATED DIFF; Future  - VITAMIN B12 & FOLATE; Future          Pt was counseled on risks, benefits and alternatives of treatment options. All questions were asked and answered and the patient was agreeable with the treatment plan as outlined. Subjective:   Yusra Andino is a 46 y.o. female who was seen for Follow-up      HTN: patient reports stable on medications. No chest pain, sob, headache, blurred vision, leg swelling, diaphoresis, falls. Compliant with medications as prescribed. Is sometimes checking blood pressures at home and reports range is normal. No significant hyper or hypotensive episodes that the patient reports today.     Anticoagulation: she is on lifelong ac now, doing ok    Weight: hoping it is going to go down a bit if she gets more diligent on fasting again    Without wearing compression socks her leg swells up pretty significantly, the ankle swells, she is hopfull as she loses weight that will also reduce the swelling    Medications, allergies, PMH, PSH, SOCH, [address] reviewed and updated per routine protocol, see chart for review and changes if not noted here. ROS  A 12 point review of systems was negative except as noted here or in the HPI.     Objective:   Vital Signs: (As obtained by patient/caregiver at home)  Patient-Reported Vitals 10/25/2021   Patient-Reported Weight 190lb   Patient-Reported Height -   Patient-Reported Pulse 89   Patient-Reported Temperature 98.3   Patient-Reported SpO2 -   Patient-Reported Systolic  612   Patient-Reported Diastolic 85   Patient-Reported LMP 10/12/2021        [INSTRUCTIONS:  \"[x]\" Indicates a positive item  \"[]\" Indicates a negative item  -- DELETE ALL ITEMS NOT EXAMINED]    Constitutional: [x] Appears well-developed and well-nourished [x] No apparent distress      [] Abnormal -     Mental status: [x] Alert and awake  [x] Oriented to person/place/time [x] Able to follow commands    [] Abnormal -     Eyes:   EOM    [x]  Normal    [] Abnormal -   Sclera  [x]  Normal    [] Abnormal -          Discharge [x]  None visible   [] Abnormal -     HENT: [x] Normocephalic, atraumatic  [] Abnormal -   [x] Mouth/Throat: Mucous membranes are moist    External Ears [x] Normal  [] Abnormal -    Neck: [x] No visualized mass [] Abnormal -     Pulmonary/Chest: [x] Respiratory effort normal   [x] No visualized signs of difficulty breathing or respiratory distress        [] Abnormal -      Musculoskeletal:   [] Normal gait with no signs of ataxia         [x] Normal range of motion of neck        [] Abnormal -     Neurological:        [x] No Facial Asymmetry (Cranial nerve 7 motor function) (limited exam due to video visit)          [x] No gaze palsy        [] Abnormal -          Skin:        [x] No significant exanthematous lesions or discoloration noted on facial skin         [] Abnormal -            Psychiatric:       [x] Normal Affect [] Abnormal -        [x] No Hallucinations    Other pertinent observable physical exam findings:seated, no distress non toxic    We discussed the expected course, resolution and complications of the diagnosis(es) in detail. Medication risks, benefits, costs, interactions, and alternatives were discussed as indicated. I advised her to contact the office if her condition worsens, changes or fails to improve as anticipated. She expressed understanding with the diagnosis(es) and plan. Arden Gamboa is a 46 y.o. female who was evaluated by a video visit encounter for concerns as above. Patient identification was verified prior to start of the visit. A caregiver was present when appropriate. Due to this being a TeleHealth encounter (During XKAUJ-39 public health emergency), evaluation of the following organ systems was limited: Vitals/Constitutional/EENT/Resp/CV/GI//MS/Neuro/Skin/Heme-Lymph-Imm. Pursuant to the emergency declaration under the [facility], [facility] waiver authority and the [employer] and [employer] Act, this Virtual  Visit was conducted, with patient's (and/or legal guardian's) consent, to reduce the patient's risk of exposure to COVID-19 and provide necessary medical care. Services were provided through a video synchronous discussion virtually to substitute for in-person clinic visit. Patient and provider were located at their individual homes. Nallely Gar MD  [facility]  10/25/21 2:33 PM     Portions of this note may have been populated using smart dictation software and may have \"sounds-like\" errors present.

## 2021-10-25 NOTE — PROGRESS NOTES
Chief Complaint   Patient presents with    Follow-up     1. Have you been to the ER, urgent care clinic since your last visit? Hospitalized since your last visit? No    2. Have you seen or consulted any other health care providers outside of the [address] [place] [address] since your last visit? Include any pap smears or colon screening.  No

## 2022-01-04 DIAGNOSIS — K21.9 GASTROESOPHAGEAL REFLUX DISEASE WITHOUT ESOPHAGITIS: ICD-10-CM

## 2022-01-04 DIAGNOSIS — I10 BENIGN ESSENTIAL HTN: ICD-10-CM

## 2022-01-04 DIAGNOSIS — R14.2 BELCHING: ICD-10-CM

## 2022-01-04 RX ORDER — OMEPRAZOLE 40 MG/1
CAPSULE, DELAYED RELEASE ORAL
Qty: 90 CAPSULE | Refills: 0 | Status: SHIPPED | OUTPATIENT
Start: 2022-01-04 | End: 2022-03-28

## 2022-01-04 RX ORDER — METOPROLOL SUCCINATE 200 MG/1
TABLET, EXTENDED RELEASE ORAL
Qty: 90 TABLET | Refills: 0 | Status: SHIPPED | OUTPATIENT
Start: 2022-01-04 | End: 2022-03-28

## 2022-03-18 PROBLEM — D68.51 HETEROZYGOUS FACTOR V LEIDEN MUTATION (HCC): Status: ACTIVE | Noted: 2021-08-30

## 2022-03-19 PROBLEM — I10 BENIGN ESSENTIAL HTN: Status: ACTIVE | Noted: 2019-07-10

## 2022-03-19 PROBLEM — E66.09 CLASS 2 OBESITY DUE TO EXCESS CALORIES WITHOUT SERIOUS COMORBIDITY WITH BODY MASS INDEX (BMI) OF 35.0 TO 35.9 IN ADULT: Status: ACTIVE | Noted: 2019-07-09

## 2022-03-19 PROBLEM — I26.99 PULMONARY EMBOLUS (HCC): Status: ACTIVE | Noted: 2021-04-21

## 2022-03-19 PROBLEM — I82.409 DEEP VENOUS THROMBOSIS (HCC): Status: ACTIVE | Noted: 2021-04-21

## 2022-03-19 PROBLEM — D68.59 HYPERCOAGULABLE STATE (HCC): Status: ACTIVE | Noted: 2021-06-11

## 2022-03-20 PROBLEM — F40.298 SPECIFIC PHOBIA: Status: ACTIVE | Noted: 2020-10-07

## 2022-03-20 PROBLEM — Z79.899 HIGH RISK MEDICATION USE: Status: ACTIVE | Noted: 2021-06-11

## 2022-03-28 DIAGNOSIS — R14.2 BELCHING: ICD-10-CM

## 2022-03-28 DIAGNOSIS — I10 BENIGN ESSENTIAL HTN: ICD-10-CM

## 2022-03-28 DIAGNOSIS — K21.9 GASTROESOPHAGEAL REFLUX DISEASE WITHOUT ESOPHAGITIS: ICD-10-CM

## 2022-03-28 RX ORDER — METOPROLOL SUCCINATE 200 MG/1
TABLET, EXTENDED RELEASE ORAL
Qty: 90 TABLET | Refills: 0 | Status: SHIPPED | OUTPATIENT
Start: 2022-03-28 | End: 2022-06-28

## 2022-03-28 RX ORDER — OMEPRAZOLE 40 MG/1
CAPSULE, DELAYED RELEASE ORAL
Qty: 90 CAPSULE | Refills: 0 | Status: SHIPPED | OUTPATIENT
Start: 2022-03-28 | End: 2022-06-29

## 2022-05-01 DIAGNOSIS — I10 BENIGN ESSENTIAL HTN: ICD-10-CM

## 2022-05-02 RX ORDER — LISINOPRIL AND HYDROCHLOROTHIAZIDE 10; 12.5 MG/1; MG/1
TABLET ORAL
Qty: 90 TABLET | Refills: 1 | Status: SHIPPED | OUTPATIENT
Start: 2022-05-02

## 2022-06-28 DIAGNOSIS — I10 BENIGN ESSENTIAL HTN: ICD-10-CM

## 2022-06-28 DIAGNOSIS — K21.9 GASTROESOPHAGEAL REFLUX DISEASE WITHOUT ESOPHAGITIS: ICD-10-CM

## 2022-06-28 DIAGNOSIS — R14.2 BELCHING: ICD-10-CM

## 2022-06-28 RX ORDER — METOPROLOL SUCCINATE 200 MG/1
TABLET, EXTENDED RELEASE ORAL
Qty: 90 TABLET | Refills: 0 | Status: SHIPPED | OUTPATIENT
Start: 2022-06-28 | End: 2022-09-14

## 2022-06-29 RX ORDER — OMEPRAZOLE 40 MG/1
CAPSULE, DELAYED RELEASE ORAL
Qty: 90 CAPSULE | Refills: 0 | Status: SHIPPED | OUTPATIENT
Start: 2022-06-29 | End: 2022-10-28

## 2022-08-29 ENCOUNTER — TELEPHONE (OUTPATIENT)
Dept: ONCOLOGY | Age: 42
End: 2022-08-29

## 2022-09-06 ENCOUNTER — TELEPHONE (OUTPATIENT)
Dept: ONCOLOGY | Age: 42
End: 2022-09-06

## 2022-09-08 ENCOUNTER — TELEPHONE (OUTPATIENT)
Dept: ONCOLOGY | Age: 42
End: 2022-09-08

## 2022-09-13 DIAGNOSIS — I26.02 SADDLE EMBOLUS OF PULMONARY ARTERY WITH ACUTE COR PULMONALE (HCC): ICD-10-CM

## 2022-09-13 DIAGNOSIS — I82.4Y9: ICD-10-CM

## 2022-09-13 DIAGNOSIS — I10 BENIGN ESSENTIAL HTN: ICD-10-CM

## 2022-09-14 RX ORDER — RIVAROXABAN 20 MG/1
TABLET, FILM COATED ORAL
Qty: 90 TABLET | Refills: 3 | Status: SHIPPED | OUTPATIENT
Start: 2022-09-14

## 2022-09-14 RX ORDER — METOPROLOL SUCCINATE 200 MG/1
TABLET, EXTENDED RELEASE ORAL
Qty: 90 TABLET | Refills: 0 | Status: SHIPPED | OUTPATIENT
Start: 2022-09-14

## 2022-10-28 DIAGNOSIS — K21.9 GASTROESOPHAGEAL REFLUX DISEASE WITHOUT ESOPHAGITIS: ICD-10-CM

## 2022-10-28 DIAGNOSIS — R14.2 BELCHING: ICD-10-CM

## 2022-10-28 RX ORDER — OMEPRAZOLE 40 MG/1
CAPSULE, DELAYED RELEASE ORAL
Qty: 90 CAPSULE | Refills: 0 | Status: SHIPPED | OUTPATIENT
Start: 2022-10-28

## 2022-10-28 NOTE — TELEPHONE ENCOUNTER
Patient not seen in 1 year  Please assist in scheduling  No further refills wihtout visit--in person--notify patient

## 2022-11-07 DIAGNOSIS — I10 BENIGN ESSENTIAL HTN: ICD-10-CM

## 2022-11-08 DIAGNOSIS — I10 BENIGN ESSENTIAL HTN: ICD-10-CM

## 2022-11-08 RX ORDER — LISINOPRIL AND HYDROCHLOROTHIAZIDE 10; 12.5 MG/1; MG/1
1 TABLET ORAL DAILY
Qty: 90 TABLET | Refills: 1 | OUTPATIENT
Start: 2022-11-08

## 2022-11-08 RX ORDER — LISINOPRIL AND HYDROCHLOROTHIAZIDE 10; 12.5 MG/1; MG/1
1 TABLET ORAL DAILY
Qty: 90 TABLET | Refills: 0 | Status: SHIPPED | OUTPATIENT
Start: 2022-11-08

## 2022-11-08 NOTE — TELEPHONE ENCOUNTER
Not seen in >1 year  Pt was notified in October of the need for appt  Must be seen IN PERSON so we can safely continue comprehensive medical care  Please notfiy and schedule IN PERSON

## 2022-11-15 ENCOUNTER — OFFICE VISIT (OUTPATIENT)
Dept: FAMILY MEDICINE CLINIC | Age: 42
End: 2022-11-15
Payer: COMMERCIAL

## 2022-11-15 VITALS
TEMPERATURE: 97.9 F | RESPIRATION RATE: 18 BRPM | DIASTOLIC BLOOD PRESSURE: 83 MMHG | HEIGHT: 61 IN | HEART RATE: 101 BPM | WEIGHT: 206 LBS | OXYGEN SATURATION: 96 % | BODY MASS INDEX: 38.89 KG/M2 | SYSTOLIC BLOOD PRESSURE: 127 MMHG

## 2022-11-15 DIAGNOSIS — E66.09 CLASS 2 OBESITY DUE TO EXCESS CALORIES WITHOUT SERIOUS COMORBIDITY WITH BODY MASS INDEX (BMI) OF 38.0 TO 38.9 IN ADULT: ICD-10-CM

## 2022-11-15 DIAGNOSIS — Z79.899 HIGH RISK MEDICATION USE: ICD-10-CM

## 2022-11-15 DIAGNOSIS — I10 BENIGN ESSENTIAL HTN: Primary | ICD-10-CM

## 2022-11-15 DIAGNOSIS — Z23 ENCOUNTER FOR IMMUNIZATION: ICD-10-CM

## 2022-11-15 DIAGNOSIS — D68.59 HYPERCOAGULABLE STATE (HCC): ICD-10-CM

## 2022-11-15 DIAGNOSIS — R73.01 IFG (IMPAIRED FASTING GLUCOSE): ICD-10-CM

## 2022-11-15 DIAGNOSIS — E78.2 MIXED HYPERLIPIDEMIA: ICD-10-CM

## 2022-11-15 DIAGNOSIS — D68.51 HETEROZYGOUS FACTOR V LEIDEN MUTATION (HCC): ICD-10-CM

## 2022-11-15 PROBLEM — I26.99 PULMONARY EMBOLUS (HCC): Status: RESOLVED | Noted: 2021-04-21 | Resolved: 2022-11-15

## 2022-11-15 PROBLEM — I82.409 DEEP VENOUS THROMBOSIS (HCC): Status: RESOLVED | Noted: 2021-04-21 | Resolved: 2022-11-15

## 2022-11-15 PROCEDURE — 90686 IIV4 VACC NO PRSV 0.5 ML IM: CPT | Performed by: FAMILY MEDICINE

## 2022-11-15 PROCEDURE — 99214 OFFICE O/P EST MOD 30 MIN: CPT | Performed by: FAMILY MEDICINE

## 2022-11-15 PROCEDURE — 3078F DIAST BP <80 MM HG: CPT | Performed by: FAMILY MEDICINE

## 2022-11-15 PROCEDURE — 3074F SYST BP LT 130 MM HG: CPT | Performed by: FAMILY MEDICINE

## 2022-11-15 PROCEDURE — 90471 IMMUNIZATION ADMIN: CPT | Performed by: FAMILY MEDICINE

## 2022-11-15 NOTE — PATIENT INSTRUCTIONS
Here today for routine follow-up  Blood pressure is at goal  Continue with current medications  Labs per my orders  Hypercoagulable state due to factor V Leiden mutation  Continue with Xarelto  History of IFG, recommend checking A1c  Weight is creeping up and I would encourage the patient to deliberately find 150 minutes/week of moderate intensity activity and monitor her diet as well to make small healthy changes in order to help to maintain or even reduce her weight slightly  Follow-up with me with any problems otherwise I will see her in 1 year  Medication refills as due

## 2022-11-15 NOTE — PROGRESS NOTES
Cherelle Johns is a 38 y.o. female    Chief Complaint   Patient presents with    Medication Refill       Visit Vitals  /83   Pulse (!) 101   Temp 97.9 °F (36.6 °C) (Temporal)   Resp 18   Ht 5' 1\" (1.549 m)   Wt 206 lb (93.4 kg)   SpO2 96%   BMI 38.92 kg/m²       3 most recent PHQ Screens 11/15/2022   Little interest or pleasure in doing things Not at all   Feeling down, depressed, irritable, or hopeless Not at all   Total Score PHQ 2 0       No flowsheet data found. No flowsheet data found. 1. Have you been to the ER, urgent care clinic since your last visit? Hospitalized since your last visit? No     2. Have you seen or consulted any other health care providers outside of the [address] [place] [address] since your last visit? Include any pap smears or colon screening.  No

## 2022-11-15 NOTE — PROGRESS NOTES
Family Medicine Follow-Up Progress Note  Patient: Ralph Cristobal  1980, 38 y.o., female  Encounter Date: 11/15/2022    ASSESSMENT & PLAN    ICD-10-CM ICD-9-CM    1. Benign essential HTN  I10 401.1 CBC W/O DIFF      METABOLIC PANEL, COMPREHENSIVE      CBC W/O DIFF      METABOLIC PANEL, COMPREHENSIVE      2. Hypercoagulable state ([facility]  D68.59 289.81       3. Class 2 obesity due to excess calories without serious comorbidity with body mass index (BMI) of 38.0 to 38.9 in adult  E66.09 278.00     Z68.38 V85.38       4. IFG (impaired fasting glucose)  R73.01 790.21 HEMOGLOBIN A1C WITH EAG      HEMOGLOBIN A1C WITH EAG      5. Mixed hyperlipidemia  A71.2 505.3 METABOLIC PANEL, COMPREHENSIVE      LIPID PANEL      METABOLIC PANEL, COMPREHENSIVE      LIPID PANEL      6. Heterozygous factor V Leiden mutation ([facility]  D68.51 289.81       7. High risk medication use  Z79.899 V58.69       8.  Encounter for immunization  Z23 V03.89 INFLUENZA, FLUARIX, FLULAVAL, FLUZONE (AGE 6 MO+), AFLURIA(AGE 3Y+) IM, PF, 0.5 ML          Orders Placed This Encounter    Influenza, FLUARIX, FLULAVAL, FLUZONE (age 10 mo+), AFLURIA (age 3y+) IM, PF, 0.5 mL    CBC W/O DIFF     Standing Status:   Future     Number of Occurrences:   1     Standing Expiration Date:   29/93/2272    METABOLIC PANEL, COMPREHENSIVE     Standing Status:   Future     Number of Occurrences:   1     Standing Expiration Date:   11/15/2023    LIPID PANEL     Standing Status:   Future     Number of Occurrences:   1     Standing Expiration Date:   11/15/2023    HEMOGLOBIN A1C WITH EAG     Standing Status:   Future     Number of Occurrences:   1     Standing Expiration Date:   11/15/2023       Patient Instructions   Here today for routine follow-up  Blood pressure is at goal  Continue with current medications  Labs per my orders  Hypercoagulable state due to factor V Leiden mutation  Continue with Xarelto  History of IFG, recommend checking A1c  Weight is creeping up and I would encourage the patient to deliberately find 150 minutes/week of moderate intensity activity and monitor her diet as well to make small healthy changes in order to help to maintain or even reduce her weight slightly  Follow-up with me with any problems otherwise I will see her in 1 year  Medication refills as due    [facility]  Chief Complaint   Patient presents with    Medication Refill       SUBJECTIVE  Chapo Loo is a 38 y.o. female presenting today for routine follow up  HTN: patient reports stable on medications. No chest pain, sob, headache, blurred vision, leg swelling, diaphoresis, falls. Compliant with medications as prescribed. is checking blood pressures at home and reports range is 110s/70-80s. No significant hyper or hypotensive episodes that the patient reports today. Tob: no  Etoh: more than 7 drinks / week, wine and liquor mostly (chuyitatzer often)  Illicit: no    SA[de-identified] one male partner, using condoms  Declines STD testing    Wt Readings from Last 3 Encounters:   11/15/22 206 lb (93.4 kg)   08/30/21 196 lb 12.8 oz (89.3 kg)   08/12/21 189 lb 2.5 oz (85.8 kg)     Eating well, she thinks exercise hsa changed a lot  Not moving as much as she was  Diet doing well  Cut down on sugar    ROS  Review of Systems  A 12 point review of systems was negative except as noted here or in the HPI. OBJECTIVE  Visit Vitals  /83   Pulse (!) 101   Temp 97.9 °F (36.6 °C) (Temporal)   Resp 18   Ht 5' 1\" (1.549 m)   Wt 206 lb (93.4 kg)   SpO2 96%   BMI 38.92 kg/m²       Physical Exam  Vitals and nursing note reviewed. Constitutional:       General: She is not in acute distress. Appearance: Normal appearance. She is not ill-appearing, toxic-appearing or diaphoretic. HENT:      Head: Normocephalic and atraumatic. Right Ear: External ear normal.      Left Ear: External ear normal.      Mouth/Throat:      Mouth: Mucous membranes are moist.   Eyes:      General: No scleral icterus.         Right eye: No discharge. Left eye: No discharge. Comments: eom grossly intact   Neck:      Comments: No visible neck masses , ROM appears normal from visual inspection  Cardiovascular:      Rate and Rhythm: Normal rate and regular rhythm. Heart sounds: No friction rub. No gallop. Pulmonary:      Effort: Pulmonary effort is normal. No respiratory distress. Breath sounds: No stridor. No wheezing or rhonchi. Abdominal:      General: Bowel sounds are normal.      Palpations: Abdomen is soft. Skin:     General: Skin is warm and dry. Findings: No rash. Comments: Visible skin is without jaundice, bruising, lesion, pallor, erythema or rash except as otherwise noted   Neurological:      General: No focal deficit present. Mental Status: She is alert and oriented to person, place, and time. Psychiatric:         Mood and Affect: Mood normal.         Behavior: Behavior normal.         Thought Content: Thought content normal.         Judgment: Judgment normal.       No results found for any visits on 11/15/22.     HISTORICAL  Reviewed and updated today, and as noted below:    Past Medical History:   Diagnosis Date    Abnormal Pap smear of cervix 12/2020    LGSIL/+HPV    Benign essential HTN 7/10/2019    Deep venous thrombosis ([address] 4/21/2021    Dysmenorrhea 8/19/2016    Edema of both legs 7/31/2014    Hypertension     Pulmonary embolus ([address] 4/21/2021     Past Surgical History:   Procedure Laterality Date    HX HEENT      tonsillectomy     Family History   Problem Relation Age of Onset    Hypertension Mother     Clotting Disorder Neg Hx         No known VTE     Social History     Tobacco Use   Smoking Status Never   Smokeless Tobacco Never     Social History     Socioeconomic History    Marital status: SINGLE   Tobacco Use    Smoking status: Never    Smokeless tobacco: Never   Vaping Use    Vaping Use: Never used   Substance and Sexual Activity    Alcohol use: Yes    Drug use: No    Sexual activity: Yes     Partners: Male     Birth control/protection: Pill     Allergies   Allergen Reactions    Norvasc [Amlodipine] Swelling    Pcn [Penicillins] Hives       LAB REVIEW  Lab Results   Component Value Date/Time    Sodium 135 (L) 04/23/2021 03:20 AM    Potassium 3.7 04/23/2021 03:20 AM    Chloride 102 04/23/2021 03:20 AM    CO2 28 04/23/2021 03:20 AM    Anion gap 5 04/23/2021 03:20 AM    Glucose 93 04/23/2021 03:20 AM    BUN 6 04/23/2021 03:20 AM    Creatinine 0.57 04/23/2021 03:20 AM    BUN/Creatinine ratio 11 (L) 04/23/2021 03:20 AM    GFR est AA >60 04/23/2021 03:20 AM    GFR est non-AA >60 04/23/2021 03:20 AM    Calcium 9.3 04/23/2021 03:20 AM    Bilirubin, total 0.7 04/21/2021 03:54 PM    Alk. phosphatase 49 04/21/2021 03:54 PM    Protein, total 8.1 04/21/2021 03:54 PM    Albumin 3.5 04/21/2021 03:54 PM    Globulin 4.6 (H) 04/21/2021 03:54 PM    A-G Ratio 0.8 (L) 04/21/2021 03:54 PM    ALT (SGPT) 21 04/21/2021 03:54 PM     Lab Results   Component Value Date/Time    WBC 9.1 04/23/2021 03:20 AM    HGB 12.7 04/23/2021 03:20 AM    HCT 36.6 04/23/2021 03:20 AM    PLATELET 480 63/70/2606 03:20 AM    .6 (H) 04/23/2021 03:20 AM     Lab Results   Component Value Date/Time    Hemoglobin A1c 5.5 06/02/2020 12:40 PM     Lab Results   Component Value Date/Time    Cholesterol, total 195 04/21/2021 09:55 PM    HDL Cholesterol 38 04/21/2021 09:55 PM    LDL, calculated 117.4 (H) 04/21/2021 09:55 PM    VLDL, calculated 39.6 04/21/2021 09:55 PM    Triglyceride 198 (H) 04/21/2021 09:55 PM    CHOL/HDL Ratio 5.1 (H) 04/21/2021 09:55 PM           [facility]  11/15/22 4:08 PM    Portions of this note may have been populated using smart dictation software and may have \"sounds-like\" errors present. Pt was counseled on risks, benefits and alternatives of treatment options.  All questions were asked and answered and the patient was agreeable with the treatment plan as outlined.

## 2022-11-16 LAB
ALBUMIN SERPL-MCNC: 4.7 G/DL (ref 3.8–4.8)
ALBUMIN/GLOB SERPL: 1.7 {RATIO} (ref 1.2–2.2)
ALP SERPL-CCNC: 62 IU/L (ref 44–121)
ALT SERPL-CCNC: 49 IU/L (ref 0–32)
AST SERPL-CCNC: 75 IU/L (ref 0–40)
BILIRUB SERPL-MCNC: 0.9 MG/DL (ref 0–1.2)
BUN SERPL-MCNC: 4 MG/DL (ref 6–24)
BUN/CREAT SERPL: 6 (ref 9–23)
CALCIUM SERPL-MCNC: 9.5 MG/DL (ref 8.7–10.2)
CHLORIDE SERPL-SCNC: 90 MMOL/L (ref 96–106)
CHOLEST SERPL-MCNC: 293 MG/DL (ref 100–199)
CO2 SERPL-SCNC: 27 MMOL/L (ref 20–29)
COMMENT:: ABNORMAL
CREAT SERPL-MCNC: 0.65 MG/DL (ref 0.57–1)
EGFR: 114 ML/MIN/1.73
ERYTHROCYTE [DISTWIDTH] IN BLOOD BY AUTOMATED COUNT: 13.7 % (ref 11.7–15.4)
EST. AVERAGE GLUCOSE BLD GHB EST-MCNC: 111 MG/DL
GLOBULIN SER CALC-MCNC: 2.7 G/DL (ref 1.5–4.5)
GLUCOSE SERPL-MCNC: 118 MG/DL (ref 70–99)
HBA1C MFR BLD: 5.5 % (ref 4.8–5.6)
HCT VFR BLD AUTO: 40.9 % (ref 34–46.6)
HDLC SERPL-MCNC: 68 MG/DL
HGB BLD-MCNC: 15.2 G/DL (ref 11.1–15.9)
IMP & REVIEW OF LAB RESULTS: NORMAL
LDLC SERPL CALC-MCNC: 193 MG/DL (ref 0–99)
MCH RBC QN AUTO: 40.8 PG (ref 26.6–33)
MCHC RBC AUTO-ENTMCNC: 37.2 G/DL (ref 31.5–35.7)
MCV RBC AUTO: 110 FL (ref 79–97)
MORPHOLOGY BLD-IMP: ABNORMAL
PLATELET # BLD AUTO: 276 X10E3/UL (ref 150–450)
POTASSIUM SERPL-SCNC: 3.8 MMOL/L (ref 3.5–5.2)
PROT SERPL-MCNC: 7.4 G/DL (ref 6–8.5)
RBC # BLD AUTO: 3.73 X10E6/UL (ref 3.77–5.28)
SODIUM SERPL-SCNC: 137 MMOL/L (ref 134–144)
TRIGL SERPL-MCNC: 175 MG/DL (ref 0–149)
VLDLC SERPL CALC-MCNC: 32 MG/DL (ref 5–40)
WBC # BLD AUTO: 7.7 X10E3/UL (ref 3.4–10.8)

## 2022-11-17 ENCOUNTER — TELEPHONE (OUTPATIENT)
Dept: FAMILY MEDICINE CLINIC | Age: 42
End: 2022-11-17

## 2022-11-17 NOTE — TELEPHONE ENCOUNTER
Called patient  I'll see her at 9am tomorrow  I put her on the schedule and we'll review her concerns in the AM

## 2022-11-18 ENCOUNTER — OFFICE VISIT (OUTPATIENT)
Dept: FAMILY MEDICINE CLINIC | Age: 42
End: 2022-11-18
Payer: COMMERCIAL

## 2022-11-18 VITALS
OXYGEN SATURATION: 98 % | DIASTOLIC BLOOD PRESSURE: 64 MMHG | RESPIRATION RATE: 16 BRPM | TEMPERATURE: 97.3 F | BODY MASS INDEX: 38.89 KG/M2 | SYSTOLIC BLOOD PRESSURE: 108 MMHG | HEIGHT: 61 IN | HEART RATE: 58 BPM | WEIGHT: 206 LBS

## 2022-11-18 DIAGNOSIS — Z79.899 HIGH RISK MEDICATION USE: ICD-10-CM

## 2022-11-18 DIAGNOSIS — D68.59 HYPERCOAGULABLE STATE (HCC): ICD-10-CM

## 2022-11-18 DIAGNOSIS — R79.89 ELEVATED LIVER FUNCTION TESTS: ICD-10-CM

## 2022-11-18 DIAGNOSIS — E66.01 SEVERE OBESITY (BMI 35.0-39.9) WITH COMORBIDITY (HCC): ICD-10-CM

## 2022-11-18 DIAGNOSIS — D75.89 MACROCYTOSIS WITHOUT ANEMIA: Primary | ICD-10-CM

## 2022-11-18 DIAGNOSIS — E78.2 MIXED HYPERLIPIDEMIA: ICD-10-CM

## 2022-11-18 PROBLEM — I50.22 CHRONIC SYSTOLIC (CONGESTIVE) HEART FAILURE (HCC): Status: ACTIVE | Noted: 2022-11-18

## 2022-11-18 PROCEDURE — 3074F SYST BP LT 130 MM HG: CPT | Performed by: FAMILY MEDICINE

## 2022-11-18 PROCEDURE — 3078F DIAST BP <80 MM HG: CPT | Performed by: FAMILY MEDICINE

## 2022-11-18 PROCEDURE — 99214 OFFICE O/P EST MOD 30 MIN: CPT | Performed by: FAMILY MEDICINE

## 2022-11-18 NOTE — PROGRESS NOTES
Family Medicine Follow-Up Progress Note  Patient: Beth Amin  1980, 38 y.o., female  Encounter Date: 11/18/2022    ASSESSMENT & PLAN    ICD-10-CM ICD-9-CM    1. Macrocytosis without anemia  D75.89 289.89 VITAMIN B12 & FOLATE      PERIPHERAL SMEAR      CBC WITH AUTOMATED DIFF      2. Hypercoagulable state ([address]  D68.59 289.81 VITAMIN B12 & FOLATE      PERIPHERAL SMEAR      CBC WITH AUTOMATED DIFF      3. High risk medication use  Z79.899 V58.69 VITAMIN B12 & FOLATE      PERIPHERAL SMEAR      CBC WITH AUTOMATED DIFF      4. Severe obesity (BMI 35.0-39. 9) with comorbidity ([address]  E66.01 278.01       5. Mixed hyperlipidemia  E78.2 272.2 HEPATIC FUNCTION PANEL      LIPID PANEL      6. Elevated liver function tests  R79.89 790.6 HEPATIC FUNCTION PANEL      US ABD LTD          Orders Placed This Encounter    US ABD LTD     Standing Status:   Future     Standing Expiration Date:   12/18/2023     Order Specific Question:   Is Patient Pregnant?      Answer:   No     Order Specific Question:   Specific Body Part     Answer:   liver     Order Specific Question:   Reason for Exam     Answer:   elevated LFT, obesity    VITAMIN B12 & FOLATE     Standing Status:   Future     Standing Expiration Date:   11/18/2023    PERIPHERAL SMEAR     Standing Status:   Future     Standing Expiration Date:   11/18/2023    CBC WITH AUTOMATED DIFF     Standing Status:   Future     Standing Expiration Date:   11/18/2023    HEPATIC FUNCTION PANEL     Standing Status:   Future     Standing Expiration Date:   11/18/2023    LIPID PANEL     Standing Status:   Future     Standing Expiration Date:   11/18/2023       Patient Instructions   Here today in follow-up  Macrocytosis on labs, we will check a vitamin B12 and folate and also a CBC with peripheral smear on recheck  This is not a new finding and the patient did have some abnormal findings on her CBC that may be related to her hypercoagulable state, I have encouraged her to take a B12 and folate supplement for now over-the-counter and also to decrease her alcohol intake. She is already eliminated  With regards to her hyperlipidemia she would like a 3 to 4-month trial of aggressive lifestyle interventions prior to initiation of treatment. They agree to this and I would like her to follow a low-carb high-fiber diet and work towards healthy weight loss. Given her elevated liver function test which I suspect are related to nonalcoholic fatty liver disease we will also get an ultrasound and we will recheck her liver function test and cholesterol at an interval of 3 to 4 months  The patient was counseled on healthy diet measures and she will see me back after she has her fasting labs at interval    [address] [facility]  Chief Complaint   Patient presents with    Follow-up     Lab fu        SUBJECTIVE  Favian Estrada is a 38 y.o. female presenting today for lab review  Recently saw me for a physical, had labs  Macrocytosis without anemia  That this is not new but it is slightly progressing  She does consume alcohol, more during football seasons, probably more than 7 drinks per week  She also is on anticoagulation for factor V  She has had no bleeding events  She is compliant with her medication  She has gained a little weight over the last year but her lipids are considerably worse than prior. She does report fasting for her labs  She also had some liver function abnormalities and her BMI is presently at 38.9  She since receiving her lab results has decreased her alcohol consumption and worked to make her diet based in whole foods and is eating lean proteins and predominantly vegetables now. She would like to try lifestyle interventions before she would add medications if it is safe to do so    ROS  Review of Systems  A 12 point review of systems was negative except as noted here or in the HPI.     OBJECTIVE  Visit Vitals  /64   Pulse (!) 58   Temp 97.3 °F (36.3 °C)   Resp 16   Ht 5' 1\" (1.549 m)   Wt 206 lb (93.4 kg)   SpO2 98%   BMI 38.92 kg/m²       Physical Exam  Vitals and nursing note reviewed. Constitutional:       General: She is not in acute distress. Appearance: Normal appearance. She is obese. She is not ill-appearing, toxic-appearing or diaphoretic. HENT:      Head: Normocephalic and atraumatic. Right Ear: External ear normal.      Left Ear: External ear normal.      Mouth/Throat:      Mouth: Mucous membranes are moist.   Eyes:      General: No scleral icterus. Right eye: No discharge. Left eye: No discharge. Comments: eom grossly intact   Neck:      Comments: No visible neck masses , ROM appears normal from visual inspection  Pulmonary:      Effort: Pulmonary effort is normal. No respiratory distress. Skin:     Comments: Visible skin is without jaundice, bruising, lesion, pallor, erythema or rash except as otherwise noted   Neurological:      General: No focal deficit present. Mental Status: She is alert and oriented to person, place, and time. Psychiatric:         Mood and Affect: Mood normal.         Behavior: Behavior normal.         Thought Content: Thought content normal.         Judgment: Judgment normal.       No results found for any visits on 11/18/22.     HISTORICAL  Reviewed and updated today, and as noted below:    Past Medical History:   Diagnosis Date    Abnormal Pap smear of cervix 12/2020    LGSIL/+HPV    Benign essential HTN 7/10/2019    Deep venous thrombosis ([address] 4/21/2021    Dysmenorrhea 8/19/2016    Edema of both legs 7/31/2014    Hypertension     Pulmonary embolus ([address] 4/21/2021     Past Surgical History:   Procedure Laterality Date    HX HEENT      tonsillectomy     Family History   Problem Relation Age of Onset    Hypertension Mother     Clotting Disorder Neg Hx         No known VTE     Social History     Tobacco Use   Smoking Status Never   Smokeless Tobacco Never     Social History     Socioeconomic History    Marital status: SINGLE   Tobacco Use    Smoking status: Never    Smokeless tobacco: Never   Vaping Use    Vaping Use: Never used   Substance and Sexual Activity    Alcohol use: Yes    Drug use: No    Sexual activity: Yes     Partners: Male     Birth control/protection: Pill     Allergies   Allergen Reactions    Norvasc [Amlodipine] Swelling    Pcn [Penicillins] Hives       LAB REVIEW  Lab Results   Component Value Date/Time    Sodium 137 11/15/2022 05:02 PM    Potassium 3.8 11/15/2022 05:02 PM    Chloride 90 (L) 11/15/2022 05:02 PM    CO2 27 11/15/2022 05:02 PM    Anion gap 5 04/23/2021 03:20 AM    Glucose 118 (H) 11/15/2022 05:02 PM    BUN 4 (L) 11/15/2022 05:02 PM    Creatinine 0.65 11/15/2022 05:02 PM    BUN/Creatinine ratio 6 (L) 11/15/2022 05:02 PM    GFR est AA >60 04/23/2021 03:20 AM    GFR est non-AA >60 04/23/2021 03:20 AM    Calcium 9.5 11/15/2022 05:02 PM    Bilirubin, total 0.9 11/15/2022 05:02 PM    Alk.  phosphatase 62 11/15/2022 05:02 PM    Protein, total 7.4 11/15/2022 05:02 PM    Albumin 4.7 11/15/2022 05:02 PM    Globulin 4.6 (H) 04/21/2021 03:54 PM    A-G Ratio 1.7 11/15/2022 05:02 PM    ALT (SGPT) 49 (H) 11/15/2022 05:02 PM     Lab Results   Component Value Date/Time    WBC 7.7 11/15/2022 05:02 PM    HGB 15.2 11/15/2022 05:02 PM    HCT 40.9 11/15/2022 05:02 PM    PLATELET 234 52/06/9182 05:02 PM     (H) 11/15/2022 05:02 PM     Lab Results   Component Value Date/Time    Hemoglobin A1c 5.5 11/15/2022 05:02 PM     Lab Results   Component Value Date/Time    Cholesterol, total 293 (H) 11/15/2022 05:02 PM    HDL Cholesterol 68 11/15/2022 05:02 PM    LDL, calculated 193 (H) 11/15/2022 05:02 PM    LDL, calculated 117.4 (H) 04/21/2021 09:55 PM    VLDL, calculated 32 11/15/2022 05:02 PM    VLDL, calculated 39.6 04/21/2021 09:55 PM    Triglyceride 175 (H) 11/15/2022 05:02 PM    CHOL/HDL Ratio 5.1 (H) 04/21/2021 09:55 PM           Latrice Fernandez MD  [address] [place] [address] Practice  11/18/22 9:23 AM    Portions of this note may have been populated using smart dictation software and may have \"sounds-like\" errors present. Pt was counseled on risks, benefits and alternatives of treatment options. All questions were asked and answered and the patient was agreeable with the treatment plan as outlined.

## 2022-11-18 NOTE — PATIENT INSTRUCTIONS
Here today in follow-up  Macrocytosis on labs, we will check a vitamin B12 and folate and also a CBC with peripheral smear on recheck  This is not a new finding and the patient did have some abnormal findings on her CBC that may be related to her hypercoagulable state, I have encouraged her to take a B12 and folate supplement for now over-the-counter and also to decrease her alcohol intake. She is already eliminated  With regards to her hyperlipidemia she would like a 3 to 4-month trial of aggressive lifestyle interventions prior to initiation of treatment. They agree to this and I would like her to follow a low-carb high-fiber diet and work towards healthy weight loss.   Given her elevated liver function test which I suspect are related to nonalcoholic fatty liver disease we will also get an ultrasound and we will recheck her liver function test and cholesterol at an interval of 3 to 4 months  The patient was counseled on healthy diet measures and she will see me back after she has her fasting labs at interval

## 2022-11-28 ENCOUNTER — HOSPITAL ENCOUNTER (OUTPATIENT)
Dept: ULTRASOUND IMAGING | Age: 42
Discharge: HOME | End: 2022-11-28
Attending: FAMILY MEDICINE
Payer: COMMERCIAL

## 2022-11-28 DIAGNOSIS — R79.89 ELEVATED LIVER FUNCTION TESTS: ICD-10-CM

## 2022-11-28 PROCEDURE — 76705 ECHO EXAM OF ABDOMEN: CPT

## 2022-12-29 DIAGNOSIS — I10 BENIGN ESSENTIAL HTN: ICD-10-CM

## 2022-12-29 RX ORDER — METOPROLOL SUCCINATE 200 MG/1
TABLET, EXTENDED RELEASE ORAL
Qty: 90 TABLET | Refills: 0 | Status: SHIPPED | OUTPATIENT
Start: 2022-12-29

## 2023-01-28 DIAGNOSIS — R14.2 BELCHING: ICD-10-CM

## 2023-01-28 DIAGNOSIS — K21.9 GASTROESOPHAGEAL REFLUX DISEASE WITHOUT ESOPHAGITIS: ICD-10-CM

## 2023-01-28 DIAGNOSIS — I10 BENIGN ESSENTIAL HTN: ICD-10-CM

## 2023-01-30 RX ORDER — LISINOPRIL AND HYDROCHLOROTHIAZIDE 10; 12.5 MG/1; MG/1
TABLET ORAL
Qty: 90 TABLET | Refills: 0 | Status: SHIPPED | OUTPATIENT
Start: 2023-01-30

## 2023-01-30 RX ORDER — OMEPRAZOLE 40 MG/1
CAPSULE, DELAYED RELEASE ORAL
Qty: 90 CAPSULE | Refills: 0 | Status: SHIPPED | OUTPATIENT
Start: 2023-01-30

## 2023-02-24 DIAGNOSIS — E53.8 LOW FOLIC ACID: Primary | ICD-10-CM

## 2023-02-24 DIAGNOSIS — Z79.899 HIGH RISK MEDICATION USE: ICD-10-CM

## 2023-02-24 DIAGNOSIS — E78.2 MIXED HYPERLIPIDEMIA: ICD-10-CM

## 2023-02-24 DIAGNOSIS — D68.59 HYPERCOAGULABLE STATE (HCC): ICD-10-CM

## 2023-02-24 DIAGNOSIS — R79.89 ELEVATED LIVER FUNCTION TESTS: ICD-10-CM

## 2023-02-24 DIAGNOSIS — D75.89 MACROCYTOSIS WITHOUT ANEMIA: ICD-10-CM

## 2023-02-25 LAB
ALBUMIN SERPL-MCNC: 3.8 G/DL (ref 3.5–5)
ALBUMIN/GLOB SERPL: 1.2 (ref 1.1–2.2)
ALP SERPL-CCNC: 60 U/L (ref 45–117)
ALT SERPL-CCNC: 19 U/L (ref 12–78)
AST SERPL-CCNC: 16 U/L (ref 15–37)
BASOPHILS # BLD: 0 K/UL (ref 0–0.1)
BASOPHILS NFR BLD: 0 % (ref 0–1)
BILIRUB DIRECT SERPL-MCNC: 0.2 MG/DL (ref 0–0.2)
BILIRUB SERPL-MCNC: 0.5 MG/DL (ref 0.2–1)
CHOLEST SERPL-MCNC: 218 MG/DL
DIFFERENTIAL METHOD BLD: NORMAL
EOSINOPHIL # BLD: 0.2 K/UL (ref 0–0.4)
EOSINOPHIL NFR BLD: 3 % (ref 0–7)
ERYTHROCYTE [DISTWIDTH] IN BLOOD BY AUTOMATED COUNT: 12.7 % (ref 11.5–14.5)
FOLATE SERPL-MCNC: 2.3 NG/ML (ref 5–21)
GLOBULIN SER CALC-MCNC: 3.3 G/DL (ref 2–4)
HCT VFR BLD AUTO: 39.7 % (ref 35–47)
HDLC SERPL-MCNC: 42 MG/DL
HDLC SERPL: 5.2 (ref 0–5)
HGB BLD-MCNC: 12.9 G/DL (ref 11.5–16)
IMM GRANULOCYTES # BLD AUTO: 0 K/UL (ref 0–0.04)
IMM GRANULOCYTES NFR BLD AUTO: 0 % (ref 0–0.5)
LDLC SERPL CALC-MCNC: 140.4 MG/DL (ref 0–100)
LYMPHOCYTES # BLD: 2.8 K/UL (ref 0.8–3.5)
LYMPHOCYTES NFR BLD: 33 % (ref 12–49)
MCH RBC QN AUTO: 31.7 PG (ref 26–34)
MCHC RBC AUTO-ENTMCNC: 32.5 G/DL (ref 30–36.5)
MCV RBC AUTO: 97.5 FL (ref 80–99)
MONOCYTES # BLD: 0.5 K/UL (ref 0–1)
MONOCYTES NFR BLD: 5 % (ref 5–13)
NEUTS SEG # BLD: 4.9 K/UL (ref 1.8–8)
NEUTS SEG NFR BLD: 59 % (ref 32–75)
NRBC # BLD: 0 K/UL (ref 0–0.01)
NRBC BLD-RTO: 0 PER 100 WBC
PERIPHERAL SMEAR,PSM: NORMAL
PLATELET # BLD AUTO: 379 K/UL (ref 150–400)
PMV BLD AUTO: 10.2 FL (ref 8.9–12.9)
PROT SERPL-MCNC: 7.1 G/DL (ref 6.4–8.2)
RBC # BLD AUTO: 4.07 M/UL (ref 3.8–5.2)
TRIGL SERPL-MCNC: 178 MG/DL (ref ?–150)
VIT B12 SERPL-MCNC: 887 PG/ML (ref 193–986)
VLDLC SERPL CALC-MCNC: 35.6 MG/DL
WBC # BLD AUTO: 8.4 K/UL (ref 3.6–11)

## 2023-02-27 RX ORDER — UREA 10 %
800 LOTION (ML) TOPICAL DAILY
Qty: 90 TABLET | Refills: 3 | Status: SHIPPED | OUTPATIENT
Start: 2023-02-27

## 2023-03-01 ENCOUNTER — VIRTUAL VISIT (OUTPATIENT)
Dept: FAMILY MEDICINE CLINIC | Age: 43
End: 2023-03-01
Payer: COMMERCIAL

## 2023-03-01 VITALS — WEIGHT: 179 LBS | BODY MASS INDEX: 33.82 KG/M2

## 2023-03-01 DIAGNOSIS — E78.2 MIXED HYPERLIPIDEMIA: ICD-10-CM

## 2023-03-01 DIAGNOSIS — D68.59 HYPERCOAGULABLE STATE (HCC): ICD-10-CM

## 2023-03-01 DIAGNOSIS — E53.8 FOLATE DEFICIENCY: ICD-10-CM

## 2023-03-01 DIAGNOSIS — K21.9 GASTROESOPHAGEAL REFLUX DISEASE WITHOUT ESOPHAGITIS: Primary | ICD-10-CM

## 2023-03-01 DIAGNOSIS — I10 PRIMARY HYPERTENSION: ICD-10-CM

## 2023-03-01 DIAGNOSIS — E66.09 CLASS 1 OBESITY DUE TO EXCESS CALORIES WITH SERIOUS COMORBIDITY AND BODY MASS INDEX (BMI) OF 33.0 TO 33.9 IN ADULT: ICD-10-CM

## 2023-03-01 DIAGNOSIS — D75.89 MACROCYTOSIS WITHOUT ANEMIA: ICD-10-CM

## 2023-03-01 DIAGNOSIS — M25.572 LEFT LATERAL ANKLE PAIN: ICD-10-CM

## 2023-03-01 PROBLEM — I50.22 CHRONIC SYSTOLIC (CONGESTIVE) HEART FAILURE (HCC): Status: RESOLVED | Noted: 2022-11-18 | Resolved: 2023-03-01

## 2023-03-01 PROBLEM — R20.0 NUMBNESS AND TINGLING IN BOTH HANDS: Status: ACTIVE | Noted: 2019-02-21

## 2023-03-01 PROBLEM — R00.0 TACHYCARDIA: Status: ACTIVE | Noted: 2019-02-11

## 2023-03-01 PROBLEM — R20.2 NUMBNESS AND TINGLING IN BOTH HANDS: Status: ACTIVE | Noted: 2019-02-21

## 2023-03-01 PROCEDURE — 99214 OFFICE O/P EST MOD 30 MIN: CPT | Performed by: FAMILY MEDICINE

## 2023-03-01 NOTE — PROGRESS NOTES
Fallon Jha is a 38 y.o. female who was seen by synchronous (real-time) audio-video technology on 3/1/2023. Consent: aFllon Jha, who was seen by synchronous (real-time) audio-video technology, and/or her healthcare decision maker, is aware that this patient-initiated, Telehealth encounter on 3/1/2023 is a billable service, with coverage as determined by her insurance carrier. She is aware that she may receive a bill and has provided verbal consent to proceed: Yes. Assessment & Plan:       ICD-10-CM ICD-9-CM    1. Gastroesophageal reflux disease without esophagitis  K21.9 530.81       2. Hypercoagulable state ([facility]  D68.59 289.81 CBC W/O DIFF      3. Class 1 obesity due to excess calories with serious comorbidity and body mass index (BMI) of 33.0 to 33.9 in adult  E66.09 278.00     Z68.33 V85.33       4. Left lateral ankle pain  M25.572 719.47       5. Folate deficiency  E53.8 266.2 FOLATE      6. Primary hypertension  C13 301.9 METABOLIC PANEL, COMPREHENSIVE      7. Macrocytosis without anemia  D75.89 289.89 CBC W/O DIFF      8. Mixed hyperlipidemia  E78.2 272.2 LIPID PANEL        30 lbs weigh loss  Cholesterol improved by 50 pts LDL  Folate deficiency identified, macrocytosis improving  No change to meds  Labs prior to next visit  Call if needing me sooner  GREAT JOB! Pt was counseled on risks, benefits and alternatives of treatment options. All questions were asked and answered and the patient was agreeable with the treatment plan as outlined.     Subjective:   Fallon Jha is a 38 y.o. female who was seen for Results (Discuss lab results from 02/24/2023)      She says she's feelign good, she has lost weight  25-30 lbs down since seeing me in November    Since she saw me last she's cut down tremendously on alcohol    Macrocytosis is improved  IS folate deficient  LDL cholesterol down 50 points  Triglycerides are stable    Her goal is to reduce bp meds if possible  HTN: patient reports stable on medications. No chest pain, sob, headache, blurred vision, leg swelling, diaphoresis, falls. Compliant with medications as prescribed. No significant hyper or hypotensive episodes that the patient reports today. Acid reflux seems better  Legs aren't swelling as much (post phlebetic swelling)     She is going to keep on the psyllium  She has a long term goal of reducing medications  She's going to return to the foot doctor she had seen before for an AFTL tendonitis v rupture (L ankle)    Medications, allergies, PMH, PSH, SOCH, [address] reviewed and updated per routine protocol, see chart for review and changes if not noted here. ROS  A 12 point review of systems was negative except as noted here or in the HPI.     Objective:   Vital Signs: (As obtained by patient/caregiver at home)  Patient-Reported Vitals 3/1/2023   Patient-Reported Weight 179   Patient-Reported Height -   Patient-Reported Pulse 86   Patient-Reported Temperature -   Patient-Reported SpO2 N/a   Patient-Reported Systolic  845   Patient-Reported Diastolic 77   Patient-Reported Peak Flow N/a   Patient-Reported LMP 2/24/2023        [INSTRUCTIONS:  \"[x]\" Indicates a positive item  \"[]\" Indicates a negative item  -- DELETE ALL ITEMS NOT EXAMINED]    Constitutional: [x] Appears well-developed and well-nourished [x] No apparent distress      [] Abnormal -     Mental status: [x] Alert and awake  [x] Oriented to person/place/time [x] Able to follow commands    [] Abnormal -     Eyes:   EOM    [x]  Normal    [] Abnormal -   Sclera  [x]  Normal    [] Abnormal -          Discharge [x]  None visible   [] Abnormal -     HENT: [x] Normocephalic, atraumatic  [] Abnormal -   [x] Mouth/Throat: Mucous membranes are moist    External Ears [x] Normal  [] Abnormal -    Neck: [x] No visualized mass [] Abnormal -     Pulmonary/Chest: [x] Respiratory effort normal   [x] No visualized signs of difficulty breathing or respiratory distress        [] Abnormal - Musculoskeletal:   [] Normal gait with no signs of ataxia         [x] Normal range of motion of neck        [] Abnormal -     Neurological:        [x] No Facial Asymmetry (Cranial nerve 7 motor function) (limited exam due to video visit)          [x] No gaze palsy        [] Abnormal -          Skin:        [x] No significant exanthematous lesions or discoloration noted on facial skin         [] Abnormal -            Psychiatric:       [x] Normal Affect [] Abnormal -        [x] No Hallucinations    Other pertinent observable physical exam findings:seated wearing glasses    We discussed the expected course, resolution and complications of the diagnosis(es) in detail. Medication risks, benefits, costs, interactions, and alternatives were discussed as indicated. I advised her to contact the office if her condition worsens, changes or fails to improve as anticipated. She expressed understanding with the diagnosis(es) and plan. Patti Fothergill is a 38 y.o. female who was evaluated by a video visit encounter for concerns as above. Patient identification was verified prior to start of the visit. A caregiver was present when appropriate. Due to this being a TeleHealth encounter (During [facility]-18 public health emergency), evaluation of the following organ systems was limited: Vitals/Constitutional/EENT/Resp/CV/GI//MS/Neuro/Skin/Heme-Lymph-Imm. Pursuant to the emergency declaration under the [facility], 1135 waiver authority and the [employer] Act, this Virtual  Visit was conducted, with patient's (and/or legal guardian's) consent, to reduce the patient's risk of exposure to COVID-19 and provide necessary medical care. Services were provided through a video synchronous discussion virtually to substitute for in-person clinic visit. Patient and provider were located at their individual homes.       Millie Chew MD [facility]  03/01/23 9:16 AM     Portions of this note may have been populated using smart dictation software and may have \"sounds-like\" errors present.

## 2023-03-26 DIAGNOSIS — I10 BENIGN ESSENTIAL HTN: ICD-10-CM

## 2023-03-27 RX ORDER — METOPROLOL SUCCINATE 200 MG/1
TABLET, EXTENDED RELEASE ORAL
Qty: 90 TABLET | Refills: 0 | Status: SHIPPED | OUTPATIENT
Start: 2023-03-27

## 2023-05-01 DIAGNOSIS — R14.2 BELCHING: ICD-10-CM

## 2023-05-01 DIAGNOSIS — I10 BENIGN ESSENTIAL HTN: ICD-10-CM

## 2023-05-01 DIAGNOSIS — K21.9 GASTROESOPHAGEAL REFLUX DISEASE WITHOUT ESOPHAGITIS: ICD-10-CM

## 2023-05-01 RX ORDER — LISINOPRIL AND HYDROCHLOROTHIAZIDE 10; 12.5 MG/1; MG/1
TABLET ORAL
Qty: 90 TABLET | Refills: 0 | Status: SHIPPED | OUTPATIENT
Start: 2023-05-01

## 2023-05-01 RX ORDER — OMEPRAZOLE 40 MG/1
CAPSULE, DELAYED RELEASE ORAL
Qty: 90 CAPSULE | Refills: 0 | Status: SHIPPED | OUTPATIENT
Start: 2023-05-01

## 2023-05-21 RX ORDER — UREA 10 %
800 LOTION (ML) TOPICAL DAILY
COMMUNITY
Start: 2023-02-27

## 2023-05-21 RX ORDER — LISINOPRIL AND HYDROCHLOROTHIAZIDE 12.5; 1 MG/1; MG/1
1 TABLET ORAL DAILY
COMMUNITY
Start: 2023-05-01

## 2023-05-21 RX ORDER — METOPROLOL SUCCINATE 200 MG/1
1 TABLET, EXTENDED RELEASE ORAL DAILY
COMMUNITY
Start: 2023-03-27 | End: 2023-06-26

## 2023-05-21 RX ORDER — OMEPRAZOLE 40 MG/1
1 CAPSULE, DELAYED RELEASE ORAL DAILY
COMMUNITY
Start: 2023-05-01

## 2023-06-25 DIAGNOSIS — I10 ESSENTIAL (PRIMARY) HYPERTENSION: ICD-10-CM

## 2023-06-26 RX ORDER — METOPROLOL SUCCINATE 200 MG/1
TABLET, EXTENDED RELEASE ORAL
Qty: 90 TABLET | Refills: 1 | Status: SHIPPED | OUTPATIENT
Start: 2023-06-26

## 2023-07-28 ENCOUNTER — APPOINTMENT (OUTPATIENT)
Dept: VASCULAR SURGERY | Facility: HOSPITAL | Age: 43
End: 2023-07-28
Payer: COMMERCIAL

## 2023-07-28 ENCOUNTER — HOSPITAL ENCOUNTER (EMERGENCY)
Facility: HOSPITAL | Age: 43
Discharge: HOME | End: 2023-07-28
Attending: STUDENT IN AN ORGANIZED HEALTH CARE EDUCATION/TRAINING PROGRAM
Payer: COMMERCIAL

## 2023-07-28 VITALS
WEIGHT: 177 LBS | HEART RATE: 83 BPM | TEMPERATURE: 98.2 F | BODY MASS INDEX: 33.42 KG/M2 | SYSTOLIC BLOOD PRESSURE: 110 MMHG | HEIGHT: 61 IN | RESPIRATION RATE: 18 BRPM | DIASTOLIC BLOOD PRESSURE: 82 MMHG | OXYGEN SATURATION: 96 %

## 2023-07-28 DIAGNOSIS — R14.2 ERUCTATION: ICD-10-CM

## 2023-07-28 DIAGNOSIS — K21.9 GASTRO-ESOPHAGEAL REFLUX DISEASE WITHOUT ESOPHAGITIS: ICD-10-CM

## 2023-07-28 DIAGNOSIS — M79.662 PAIN IN LEFT LOWER LEG: Primary | ICD-10-CM

## 2023-07-28 DIAGNOSIS — I10 ESSENTIAL (PRIMARY) HYPERTENSION: ICD-10-CM

## 2023-07-28 LAB — ECHO BSA: 1.86 M2

## 2023-07-28 PROCEDURE — 99284 EMERGENCY DEPT VISIT MOD MDM: CPT

## 2023-07-28 PROCEDURE — 93971 EXTREMITY STUDY: CPT

## 2023-07-28 ASSESSMENT — PAIN SCALES - GENERAL: PAINLEVEL_OUTOF10: 7

## 2023-07-28 ASSESSMENT — PAIN - FUNCTIONAL ASSESSMENT: PAIN_FUNCTIONAL_ASSESSMENT: 0-10

## 2023-07-28 ASSESSMENT — ENCOUNTER SYMPTOMS: SHORTNESS OF BREATH: 0

## 2023-07-28 NOTE — DISCHARGE INSTRUCTIONS
Take tylenol and use lidocaine 4% patch to help with pain. You may also use warm heat or ice to help with pain. Follow-up with your primary care provider next week for reevaluation.

## 2023-07-28 NOTE — ED TRIAGE NOTES
Patient arrived via POV and ambulatory to triage. Patient c/o LLE redness, pain and intermittent numbness that started a few days ago. Patient stated she has been working out more recently too     Patient has had DVT in LLE, patient takes Xarelto as prescribed. Denies chest pain or shortness of breath but endorses dry cough.

## 2023-07-28 NOTE — ED PROVIDER NOTES
extremities, cap refill was less than 3, and strong dorsalis pedis pulses noted in bilateral lower extremities. Doubt acute injury given pt is ambulatory while in ED and denies recent falls or injury. Doubt acute DVT given ultrasound of left lower extremity noted \"chronic, non-occulsive deep vein thrombosis in the distal femoral vein and gastrocnemius vein\". Pt is stable for discharge home. Pt was offered pain medication but pt declines at this time. Pt encouraged to take tylenol and lidocaine patch as needed for pain. Follow-up with PCP in 3-4 days. Strict return to ED precautions given. ACI discussed with the patient; see instruction below. Patient verbalized understanding. CONSULTS:  None    PROCEDURES:  Unless otherwise noted below, none     Procedures  N/A    FINAL IMPRESSION      1. Pain in left lower leg          DISPOSITION/PLAN   DISPOSITION Decision To Discharge 07/28/2023 10:18:30 AM      PATIENT REFERRED TO:  Gerardo Dick MD  [address] [phone]    Schedule an appointment as soon as possible for a visit in 3 days        DISCHARGE MEDICATIONS:  Discharge Medication List as of 7/28/2023 10:19 AM      Tylenol and lidocaine 4 % patch.       (Please note that portions of this note were completed with a voice recognition program.  Efforts were made to edit the dictations but occasionally words are mis-transcribed.)    BRIE Ley NP (electronically signed)  Emergency Attending Physician / Physician Assistant / Nurse Practitioner               BRIE Ley NP  07/28/23 [phone]

## 2023-07-28 NOTE — PROGRESS NOTES
Left LE venous duplex completed.  Preliminary results given to [facility] [address], NP

## 2023-07-31 RX ORDER — LISINOPRIL AND HYDROCHLOROTHIAZIDE 12.5; 1 MG/1; MG/1
TABLET ORAL
Qty: 90 TABLET | Refills: 0 | Status: SHIPPED | OUTPATIENT
Start: 2023-07-31

## 2023-07-31 RX ORDER — OMEPRAZOLE 40 MG/1
CAPSULE, DELAYED RELEASE ORAL
Qty: 90 CAPSULE | Refills: 0 | Status: SHIPPED | OUTPATIENT
Start: 2023-07-31

## 2023-10-03 DIAGNOSIS — I26.02 SADDLE EMBOLUS OF PULMONARY ARTERY WITH ACUTE COR PULMONALE (HCC): ICD-10-CM

## 2023-10-03 DIAGNOSIS — I82.4Y9: ICD-10-CM

## 2023-10-03 RX ORDER — RIVAROXABAN 20 MG/1
20 TABLET, FILM COATED ORAL
Qty: 90 TABLET | Refills: 3 | Status: SHIPPED | OUTPATIENT
Start: 2023-10-03

## 2023-10-15 NOTE — LETTER
4/27/2020 10:02 AM 
 
Ms. Brenna Laguerre [address] [facility] [address] Dear MsGabrielle Robel Prado missed you! Please call our office at [phone] and schedule a medication follow up appointment for your continued care. We are offering virtual appointments. Look forward to seeing you soon.   
 
 
 
Sincerely, 
 
 
Merced Barry MD 

Pt intubated, unable to obtain information

## 2023-10-31 DIAGNOSIS — I10 ESSENTIAL (PRIMARY) HYPERTENSION: ICD-10-CM

## 2023-10-31 RX ORDER — LISINOPRIL AND HYDROCHLOROTHIAZIDE 12.5; 1 MG/1; MG/1
TABLET ORAL
Qty: 90 TABLET | Refills: 0 | Status: SHIPPED | OUTPATIENT
Start: 2023-10-31

## 2023-11-01 DIAGNOSIS — R14.2 ERUCTATION: ICD-10-CM

## 2023-11-01 DIAGNOSIS — K21.9 GASTRO-ESOPHAGEAL REFLUX DISEASE WITHOUT ESOPHAGITIS: ICD-10-CM

## 2023-11-01 RX ORDER — OMEPRAZOLE 40 MG/1
CAPSULE, DELAYED RELEASE ORAL
Qty: 90 CAPSULE | Refills: 0 | Status: SHIPPED | OUTPATIENT
Start: 2023-11-01

## 2023-11-13 LAB
ERYTHROCYTE [DISTWIDTH] IN BLOOD BY AUTOMATED COUNT: 11.9 % (ref 11.5–14.5)
HCT VFR BLD AUTO: 42.7 % (ref 35–47)
HGB BLD-MCNC: 14.6 G/DL (ref 11.5–16)
MCH RBC QN AUTO: 33.8 PG (ref 26–34)
MCHC RBC AUTO-ENTMCNC: 34.2 G/DL (ref 30–36.5)
MCV RBC AUTO: 98.8 FL (ref 80–99)
NRBC # BLD: 0 K/UL (ref 0–0.01)
NRBC BLD-RTO: 0 PER 100 WBC
PLATELET # BLD AUTO: 286 K/UL (ref 150–400)
PMV BLD AUTO: 9.8 FL (ref 8.9–12.9)
RBC # BLD AUTO: 4.32 M/UL (ref 3.8–5.2)
WBC # BLD AUTO: 9.3 K/UL (ref 3.6–11)

## 2023-11-14 LAB
ALBUMIN SERPL-MCNC: 4 G/DL (ref 3.5–5)
ALBUMIN/GLOB SERPL: 1 (ref 1.1–2.2)
ALP SERPL-CCNC: 60 U/L (ref 45–117)
ALT SERPL-CCNC: 28 U/L (ref 12–78)
ANION GAP SERPL CALC-SCNC: 5 MMOL/L (ref 5–15)
AST SERPL-CCNC: 19 U/L (ref 15–37)
BILIRUB SERPL-MCNC: 0.6 MG/DL (ref 0.2–1)
BUN SERPL-MCNC: 11 MG/DL (ref 6–20)
BUN/CREAT SERPL: 15 (ref 12–20)
CALCIUM SERPL-MCNC: 9.1 MG/DL (ref 8.5–10.1)
CHLORIDE SERPL-SCNC: 100 MMOL/L (ref 97–108)
CHOLEST SERPL-MCNC: 276 MG/DL
CO2 SERPL-SCNC: 30 MMOL/L (ref 21–32)
CREAT SERPL-MCNC: 0.75 MG/DL (ref 0.55–1.02)
FOLATE SERPL-MCNC: 21.8 NG/ML (ref 5–21)
GLOBULIN SER CALC-MCNC: 4 G/DL (ref 2–4)
GLUCOSE SERPL-MCNC: 113 MG/DL (ref 65–100)
HDLC SERPL-MCNC: 72 MG/DL
HDLC SERPL: 3.8 (ref 0–5)
LDLC SERPL CALC-MCNC: 165.2 MG/DL (ref 0–100)
POTASSIUM SERPL-SCNC: 4.4 MMOL/L (ref 3.5–5.1)
PROT SERPL-MCNC: 8 G/DL (ref 6.4–8.2)
SODIUM SERPL-SCNC: 135 MMOL/L (ref 136–145)
TRIGL SERPL-MCNC: 194 MG/DL
VLDLC SERPL CALC-MCNC: 38.8 MG/DL

## 2023-11-14 NOTE — RESULT ENCOUNTER NOTE
Good work on bringing up your folic acid, it is now adequately supplemented  Cholesterol continues to be an area of opportunity and unfortunately it actually worsened from last check to now. The good news is your HDL or healthy cholesterol went up which is heart protective but your LDL also went up about 25 points and your triglycerides also went up by about 15 points.   We can absolutely address this at an upcoming visit if you would like to discuss further  Blood sugar was borderline, electrolytes liver and kidney functions were otherwise okay and blood count was normal showing no anemia or signs of infection    Nursing please call and see if we can add on an A1c I cannot see that one was done  I sent the patient a [employer] communication with the results above there is no need to call her at this time

## 2023-11-15 ENCOUNTER — TELEMEDICINE (OUTPATIENT)
Facility: CLINIC | Age: 43
End: 2023-11-15
Payer: COMMERCIAL

## 2023-11-15 DIAGNOSIS — D68.51 HETEROZYGOUS FACTOR V LEIDEN MUTATION (HCC): ICD-10-CM

## 2023-11-15 DIAGNOSIS — R73.01 IMPAIRED FASTING GLUCOSE: ICD-10-CM

## 2023-11-15 DIAGNOSIS — D68.59 HYPERCOAGULABLE STATE (HCC): Primary | ICD-10-CM

## 2023-11-15 DIAGNOSIS — E78.2 MIXED HYPERLIPIDEMIA: ICD-10-CM

## 2023-11-15 DIAGNOSIS — I10 ESSENTIAL (PRIMARY) HYPERTENSION: ICD-10-CM

## 2023-11-15 PROCEDURE — 99214 OFFICE O/P EST MOD 30 MIN: CPT | Performed by: FAMILY MEDICINE

## 2023-11-15 SDOH — ECONOMIC STABILITY: INCOME INSECURITY: HOW HARD IS IT FOR YOU TO PAY FOR THE VERY BASICS LIKE FOOD, HOUSING, MEDICAL CARE, AND HEATING?: NOT HARD AT ALL

## 2023-11-15 SDOH — ECONOMIC STABILITY: FOOD INSECURITY: WITHIN THE PAST 12 MONTHS, YOU WORRIED THAT YOUR FOOD WOULD RUN OUT BEFORE YOU GOT MONEY TO BUY MORE.: NEVER TRUE

## 2023-11-15 SDOH — ECONOMIC STABILITY: HOUSING INSECURITY
IN THE LAST 12 MONTHS, WAS THERE A TIME WHEN YOU DID NOT HAVE A STEADY PLACE TO SLEEP OR SLEPT IN A SHELTER (INCLUDING NOW)?: NO

## 2023-11-15 SDOH — ECONOMIC STABILITY: FOOD INSECURITY: WITHIN THE PAST 12 MONTHS, THE FOOD YOU BOUGHT JUST DIDN'T LAST AND YOU DIDN'T HAVE MONEY TO GET MORE.: NEVER TRUE

## 2023-11-15 ASSESSMENT — PATIENT HEALTH QUESTIONNAIRE - PHQ9
SUM OF ALL RESPONSES TO PHQ QUESTIONS 1-9: 0
SUM OF ALL RESPONSES TO PHQ QUESTIONS 1-9: 0
2. FEELING DOWN, DEPRESSED OR HOPELESS: 0
SUM OF ALL RESPONSES TO PHQ9 QUESTIONS 1 & 2: 0
SUM OF ALL RESPONSES TO PHQ QUESTIONS 1-9: 0
1. LITTLE INTEREST OR PLEASURE IN DOING THINGS: 0
SUM OF ALL RESPONSES TO PHQ QUESTIONS 1-9: 0

## 2023-11-15 NOTE — PROGRESS NOTES
Chief Complaint   Patient presents with    Discuss Medications    Follow-up     Weight check. 1. Have you been to the ER, urgent care clinic since your last visit? Hospitalized since your last visit? No    2. Have you seen or consulted any other health care providers outside of the [address] [place] [address] Avenue since your last visit? Include any pap smears or colon screening.  No
\"sounds-like\" errors present.

## 2023-12-29 DIAGNOSIS — I10 ESSENTIAL (PRIMARY) HYPERTENSION: ICD-10-CM

## 2023-12-29 RX ORDER — METOPROLOL SUCCINATE 200 MG/1
TABLET, EXTENDED RELEASE ORAL
Qty: 90 TABLET | Refills: 1 | Status: SHIPPED | OUTPATIENT
Start: 2023-12-29

## 2024-01-26 DIAGNOSIS — I10 ESSENTIAL (PRIMARY) HYPERTENSION: ICD-10-CM

## 2024-01-26 RX ORDER — LISINOPRIL AND HYDROCHLOROTHIAZIDE 12.5; 1 MG/1; MG/1
TABLET ORAL
Qty: 90 TABLET | Refills: 3 | Status: SHIPPED | OUTPATIENT
Start: 2024-01-26

## 2024-01-28 DIAGNOSIS — K21.9 GASTRO-ESOPHAGEAL REFLUX DISEASE WITHOUT ESOPHAGITIS: ICD-10-CM

## 2024-01-28 DIAGNOSIS — R14.2 ERUCTATION: ICD-10-CM

## 2024-01-31 RX ORDER — OMEPRAZOLE 40 MG/1
CAPSULE, DELAYED RELEASE ORAL
Qty: 90 CAPSULE | Refills: 1 | Status: SHIPPED | OUTPATIENT
Start: 2024-01-31

## 2024-02-01 DIAGNOSIS — D75.89 OTHER SPECIFIED DISEASES OF BLOOD AND BLOOD-FORMING ORGANS: ICD-10-CM

## 2024-02-01 DIAGNOSIS — E53.8 DEFICIENCY OF OTHER SPECIFIED B GROUP VITAMINS: ICD-10-CM

## 2024-02-05 RX ORDER — FOAM BANDAGE 3" X 3"
BANDAGE TOPICAL
Qty: 100 TABLET | Refills: 3 | Status: SHIPPED | OUTPATIENT
Start: 2024-02-05

## 2024-02-26 ENCOUNTER — TELEPHONE (OUTPATIENT)
Age: 44
End: 2024-02-26

## 2024-03-01 ENCOUNTER — OFFICE VISIT (OUTPATIENT)
Age: 44
End: 2024-03-01
Payer: COMMERCIAL

## 2024-03-01 VITALS
BODY MASS INDEX: 34.17 KG/M2 | HEIGHT: 61 IN | WEIGHT: 181 LBS | HEART RATE: 92 BPM | TEMPERATURE: 97 F | DIASTOLIC BLOOD PRESSURE: 95 MMHG | OXYGEN SATURATION: 98 % | SYSTOLIC BLOOD PRESSURE: 145 MMHG | RESPIRATION RATE: 16 BRPM

## 2024-03-01 DIAGNOSIS — I83.812 VARICOSE VEINS OF LEFT LOWER EXTREMITY WITH PAIN: Primary | ICD-10-CM

## 2024-03-01 DIAGNOSIS — D68.59 HYPERCOAGULABLE STATE (HCC): ICD-10-CM

## 2024-03-01 PROCEDURE — 99214 OFFICE O/P EST MOD 30 MIN: CPT | Performed by: INTERNAL MEDICINE

## 2024-03-01 PROCEDURE — 3080F DIAST BP >= 90 MM HG: CPT | Performed by: INTERNAL MEDICINE

## 2024-03-01 PROCEDURE — 3077F SYST BP >= 140 MM HG: CPT | Performed by: INTERNAL MEDICINE

## 2024-03-01 NOTE — PROGRESS NOTES
Chief Complaint   Patient presents with    Follow-up           Vitals:    03/01/24 1007   BP: (!) 145/95   Pulse: 92   Resp: 16   Temp: 97 °F (36.1 °C)   SpO2: 98%        Informed MD of BP. Patient has no symptoms .     1. Have you been to the ER, urgent care clinic since your last visit?  Hospitalized since your last visit?  No  2. Have you seen or consulted any other health care providers outside of the [facility] since your last visit?  Include any pap smears or colon screening. No

## 2024-03-01 NOTE — PROGRESS NOTES
[facility] [place] [facility]  [address] [address], [address] [facility] [address]  W: [phone]  F: [phone] Patient ID  Name: Venita Erwin  YOB: 1980  MRN: [id number]  Referring Provider:   No referring provider defined for this encounter.  Primary Care Provider:   Miryam Loaiza MD       HEMATOLOGY/MEDICAL ONCOLOGY  NOTE     Reason for Evaluation:     Chief Complaint   Patient presents with    Follow-up     Subjective:     History of Present Illness:   Date of Visit: 03/01/24   Venita Erwin is a 43 y.o. female who presents for a follow-up evaluation for hypercoagulable state. She was last seen in 2021.     Appetite:Grade 2  Bleeding:Grade 1  Hair Loss:Grade 1 Headache:Grade 1      Patient overall reports feeling stable.     Current Outpatient Medications   Medication Instructions    [employer] FOLIC ACID 800 MCG tablet TAKE 1 TABLET BY MOUTH DAILY. INDICATIONS: INADEQUATE FOLIC ACID    lisinopril-hydroCHLOROthiazide (PRINZIDE;ZESTORETIC) 10-12.5 MG per tablet TAKE 1 TABLET BY MOUTH EVERY DAY    metoprolol succinate (TOPROL XL) 200 MG extended release tablet TAKE 1 TABLET BY MOUTH EVERY DAY    omeprazole (PRILOSEC) 40 MG delayed release capsule TAKE 1 CAPSULE BY MOUTH EVERY DAY    Xarelto 20 mg, Oral, DAILY WITH BREAKFAST     Allergies   Allergen Reactions    Amlodipine Swelling    Penicillins Hives       Review of Systems Provided by:  Patient  Review of Systems: A complete review of systems was obtained, reviewed.  Pertinent findings reviewed above.  Past medical history, social history, and family history  are located in the electronic medical record.  Objective:     Vitals:    03/01/24 1007   BP: (!) 145/95   Pulse: 92   Resp: 16   Temp: 97 °F (36.1 °C)   SpO2: 98%     ECOG PS: 0- Fully active, able to carry on all pre-disease performance without restriction.  Physical Exam  Constitutional: No acute distress. and Non-toxic appearance.  Eyes: Normal

## 2024-04-02 ENCOUNTER — TELEPHONE (OUTPATIENT)
Age: 44
End: 2024-04-02

## 2024-04-03 ENCOUNTER — TELEPHONE (OUTPATIENT)
Age: 44
End: 2024-04-03

## 2024-04-03 NOTE — TELEPHONE ENCOUNTER
04/03/24 @2:15PM - Patient returned this users call. This user informed her of the needed imaging, she stated she would like to receive it VIA email. Informed patient we will send her a copy thru email. She stated an understanding and had no further questions or concerns.

## 2024-04-03 NOTE — TELEPHONE ENCOUNTER
04/03/24 @2:15PM -  Attempted to reach the patient.  There was no answer.  Left a voicemail for the patient to call back at their earliest convenience along with the phone number to our office.

## 2024-06-21 DIAGNOSIS — R14.2 ERUCTATION: ICD-10-CM

## 2024-06-21 DIAGNOSIS — K21.9 GASTRO-ESOPHAGEAL REFLUX DISEASE WITHOUT ESOPHAGITIS: ICD-10-CM

## 2024-06-21 RX ORDER — OMEPRAZOLE 40 MG/1
CAPSULE, DELAYED RELEASE ORAL
Qty: 90 CAPSULE | Refills: 1 | OUTPATIENT
Start: 2024-06-21

## 2024-07-01 DIAGNOSIS — I10 ESSENTIAL (PRIMARY) HYPERTENSION: ICD-10-CM

## 2024-07-01 RX ORDER — METOPROLOL SUCCINATE 200 MG/1
TABLET, EXTENDED RELEASE ORAL
Qty: 90 TABLET | Refills: 1 | OUTPATIENT
Start: 2024-07-01

## 2024-07-11 ENCOUNTER — HOSPITAL ENCOUNTER (EMERGENCY)
Facility: HOSPITAL | Age: 44
Discharge: HOME | End: 2024-07-11
Attending: EMERGENCY MEDICINE
Payer: COMMERCIAL

## 2024-07-11 ENCOUNTER — APPOINTMENT (OUTPATIENT)
Facility: HOSPITAL | Age: 44
End: 2024-07-11
Payer: COMMERCIAL

## 2024-07-11 VITALS
SYSTOLIC BLOOD PRESSURE: 149 MMHG | BODY MASS INDEX: 37.3 KG/M2 | WEIGHT: 190 LBS | HEART RATE: 91 BPM | DIASTOLIC BLOOD PRESSURE: 86 MMHG | RESPIRATION RATE: 18 BRPM | HEIGHT: 60 IN | TEMPERATURE: 97.5 F | OXYGEN SATURATION: 96 %

## 2024-07-11 DIAGNOSIS — K21.9 GASTRO-ESOPHAGEAL REFLUX DISEASE WITHOUT ESOPHAGITIS: ICD-10-CM

## 2024-07-11 DIAGNOSIS — I10 ESSENTIAL (PRIMARY) HYPERTENSION: ICD-10-CM

## 2024-07-11 DIAGNOSIS — E87.6 HYPOKALEMIA: ICD-10-CM

## 2024-07-11 DIAGNOSIS — R07.9 CHEST PAIN, UNSPECIFIED TYPE: ICD-10-CM

## 2024-07-11 DIAGNOSIS — R14.2 ERUCTATION: ICD-10-CM

## 2024-07-11 DIAGNOSIS — I10 HYPERTENSION, UNSPECIFIED TYPE: ICD-10-CM

## 2024-07-11 DIAGNOSIS — E83.42 HYPOMAGNESEMIA: Primary | ICD-10-CM

## 2024-07-11 LAB
ALBUMIN SERPL-MCNC: 4 G/DL (ref 3.5–5)
ALBUMIN/GLOB SERPL: 0.9 (ref 1.1–2.2)
ALP SERPL-CCNC: 61 U/L (ref 45–117)
ALT SERPL-CCNC: 59 U/L (ref 12–78)
ANION GAP SERPL CALC-SCNC: 12 MMOL/L (ref 5–15)
AST SERPL-CCNC: 57 U/L (ref 15–37)
BASOPHILS # BLD: 0 K/UL (ref 0–0.1)
BASOPHILS NFR BLD: 1 % (ref 0–1)
BILIRUB SERPL-MCNC: 1.3 MG/DL (ref 0.2–1)
BUN SERPL-MCNC: 8 MG/DL (ref 6–20)
BUN/CREAT SERPL: 12 (ref 12–20)
CALCIUM SERPL-MCNC: 9.4 MG/DL (ref 8.5–10.1)
CHLORIDE SERPL-SCNC: 92 MMOL/L (ref 97–108)
CO2 SERPL-SCNC: 32 MMOL/L (ref 21–32)
COMMENT:: NORMAL
CREAT SERPL-MCNC: 0.65 MG/DL (ref 0.55–1.02)
DIFFERENTIAL METHOD BLD: ABNORMAL
EOSINOPHIL # BLD: 0.1 K/UL (ref 0–0.4)
EOSINOPHIL NFR BLD: 1 % (ref 0–7)
ERYTHROCYTE [DISTWIDTH] IN BLOOD BY AUTOMATED COUNT: 11.9 % (ref 11.5–14.5)
GLOBULIN SER CALC-MCNC: 4.6 G/DL (ref 2–4)
GLUCOSE SERPL-MCNC: 116 MG/DL (ref 65–100)
HCT VFR BLD AUTO: 43 % (ref 35–47)
HGB BLD-MCNC: 15.7 G/DL (ref 11.5–16)
IMM GRANULOCYTES # BLD AUTO: 0 K/UL (ref 0–0.04)
IMM GRANULOCYTES NFR BLD AUTO: 1 % (ref 0–0.5)
LIPASE SERPL-CCNC: 32 U/L (ref 13–75)
LYMPHOCYTES # BLD: 2.6 K/UL (ref 0.8–3.5)
LYMPHOCYTES NFR BLD: 30 % (ref 12–49)
MAGNESIUM SERPL-MCNC: 1.4 MG/DL (ref 1.6–2.4)
MCH RBC QN AUTO: 34.7 PG (ref 26–34)
MCHC RBC AUTO-ENTMCNC: 36.5 G/DL (ref 30–36.5)
MCV RBC AUTO: 94.9 FL (ref 80–99)
MONOCYTES # BLD: 0.6 K/UL (ref 0–1)
MONOCYTES NFR BLD: 7 % (ref 5–13)
NEUTS SEG # BLD: 5.4 K/UL (ref 1.8–8)
NEUTS SEG NFR BLD: 60 % (ref 32–75)
NRBC # BLD: 0 K/UL (ref 0–0.01)
NRBC BLD-RTO: 0 PER 100 WBC
PLATELET # BLD AUTO: 267 K/UL (ref 150–400)
PMV BLD AUTO: 9.3 FL (ref 8.9–12.9)
POTASSIUM SERPL-SCNC: 2.8 MMOL/L (ref 3.5–5.1)
PROT SERPL-MCNC: 8.6 G/DL (ref 6.4–8.2)
RBC # BLD AUTO: 4.53 M/UL (ref 3.8–5.2)
SODIUM SERPL-SCNC: 136 MMOL/L (ref 136–145)
SPECIMEN HOLD: NORMAL
TROPONIN I SERPL HS-MCNC: 6 NG/L (ref 0–51)
WBC # BLD AUTO: 8.8 K/UL (ref 3.6–11)

## 2024-07-11 PROCEDURE — 71045 X-RAY EXAM CHEST 1 VIEW: CPT

## 2024-07-11 PROCEDURE — 6370000000 HC RX 637 (ALT 250 FOR IP): Performed by: EMERGENCY MEDICINE

## 2024-07-11 PROCEDURE — 83735 ASSAY OF MAGNESIUM: CPT

## 2024-07-11 PROCEDURE — 36415 COLL VENOUS BLD VENIPUNCTURE: CPT

## 2024-07-11 PROCEDURE — 85025 COMPLETE CBC W/AUTO DIFF WBC: CPT

## 2024-07-11 PROCEDURE — 93005 ELECTROCARDIOGRAM TRACING: CPT | Performed by: EMERGENCY MEDICINE

## 2024-07-11 PROCEDURE — 83690 ASSAY OF LIPASE: CPT

## 2024-07-11 PROCEDURE — 84484 ASSAY OF TROPONIN QUANT: CPT

## 2024-07-11 PROCEDURE — 80053 COMPREHEN METABOLIC PANEL: CPT

## 2024-07-11 PROCEDURE — 99285 EMERGENCY DEPT VISIT HI MDM: CPT

## 2024-07-11 RX ORDER — HYDROXYZINE HYDROCHLORIDE 25 MG/1
25 TABLET, FILM COATED ORAL ONCE
Status: COMPLETED | OUTPATIENT
Start: 2024-07-11 | End: 2024-07-11

## 2024-07-11 RX ORDER — LANOLIN ALCOHOL/MO/W.PET/CERES
800 CREAM (GRAM) TOPICAL DAILY
Status: DISCONTINUED | OUTPATIENT
Start: 2024-07-11 | End: 2024-07-11 | Stop reason: HOSPADM

## 2024-07-11 RX ORDER — POTASSIUM CHLORIDE 750 MG/1
40 TABLET, FILM COATED, EXTENDED RELEASE ORAL ONCE
Status: COMPLETED | OUTPATIENT
Start: 2024-07-11 | End: 2024-07-11

## 2024-07-11 RX ORDER — LISINOPRIL AND HYDROCHLOROTHIAZIDE 25; 20 MG/1; MG/1
1 TABLET ORAL DAILY
Qty: 30 TABLET | Refills: 0 | Status: SHIPPED | OUTPATIENT
Start: 2024-07-11

## 2024-07-11 RX ADMIN — POTASSIUM CHLORIDE 40 MEQ: 750 TABLET, FILM COATED, EXTENDED RELEASE ORAL at 13:43

## 2024-07-11 RX ADMIN — HYDROXYZINE HYDROCHLORIDE 25 MG: 25 TABLET ORAL at 12:49

## 2024-07-11 RX ADMIN — Medication 800 MG: at 13:43

## 2024-07-11 ASSESSMENT — PAIN - FUNCTIONAL ASSESSMENT: PAIN_FUNCTIONAL_ASSESSMENT: NONE - DENIES PAIN

## 2024-07-11 NOTE — ED TRIAGE NOTES
[facility] 15. Patient ambulatory to treatment area. Patient states that the last 2 days she has noticed an increase in BP. Patient was seen at [facility] yesterday where she was given another 30 day supply. Patient states that she was never     Last /112 using her arm cuff at home.    BP here is 162/93.    Patient states she is having anxiety about her BP and is now causing chest pain.  Patient denies any changes in her vision and denies dizziness.

## 2024-07-11 NOTE — ED PROVIDER NOTES
[id number]  [phone]    As needed, If symptoms worsen      DISCHARGE MEDICATIONS:  Discharge Medication List as of 7/11/2024  1:48 PM        START taking these medications    Details   lisinopril-hydroCHLOROthiazide (PRINZIDE;ZESTORETIC) 20-25 MG per tablet Take 1 tablet by mouth daily, Disp-30 tablet, R-0Normal           Controlled Substances Monitoring:          No data to display                (Please note that portions of this note were completed with a voice recognition program.  Efforts were made to edit the dictations but occasionally words are mis-transcribed.)    Stanley Berger MD (electronically signed)  Attending Emergency Physician           Stanley Berger MD  07/11/24 5027

## 2024-07-11 NOTE — ED NOTES
The patient was discharged home by provider in stable condition. The patient is alert and oriented, in no respiratory distress. The patient's diagnosis, condition and treatment were explained. The patient expressed understanding and denies any questions or concerns at this time. Patient leaves treatment area ambulatory with all personal belongings.

## 2024-07-11 NOTE — DISCHARGE INSTRUCTIONS
Thank you for allowing us to provide you with medical care today.  We realize that you have many choices for your emergency care needs.  We thank you for choosing [facility].  Please choose us in the future for any continued health care needs.     The exam and treatment you received in the Emergency Department were for an emergent problem and are not intended as complete care. It is important that you follow up with a doctor, nurse practitioner, or physician assistant for ongoing care. If your symptoms worsen or you do not improve as expected and you are unable to reach your usual health care provider, you should return to the Emergency Department. We are available 24 hours a day.     Please make an appointment with your healthcare provider(s) for follow up of your Emergency Department visit.  Take this sheet with you when you go to your follow-up visit.

## 2024-07-12 LAB
EKG ATRIAL RATE: 96 BPM
EKG DIAGNOSIS: NORMAL
EKG P AXIS: 26 DEGREES
EKG P-R INTERVAL: 184 MS
EKG Q-T INTERVAL: 364 MS
EKG QRS DURATION: 70 MS
EKG QTC CALCULATION (BAZETT): 459 MS
EKG R AXIS: -11 DEGREES
EKG T AXIS: -5 DEGREES
EKG VENTRICULAR RATE: 96 BPM

## 2024-07-12 RX ORDER — OMEPRAZOLE 40 MG/1
CAPSULE, DELAYED RELEASE ORAL
Qty: 90 CAPSULE | Refills: 0 | Status: SHIPPED | OUTPATIENT
Start: 2024-07-12